# Patient Record
Sex: FEMALE | Race: WHITE | Employment: OTHER | URBAN - METROPOLITAN AREA
[De-identification: names, ages, dates, MRNs, and addresses within clinical notes are randomized per-mention and may not be internally consistent; named-entity substitution may affect disease eponyms.]

---

## 2017-09-11 ENCOUNTER — HOSPITAL ENCOUNTER (OUTPATIENT)
Dept: MAMMOGRAPHY | Age: 67
Discharge: HOME | End: 2017-09-11
Payer: MEDICARE

## 2017-09-11 DIAGNOSIS — Z12.31 VISIT FOR SCREENING MAMMOGRAM: ICD-10-CM

## 2017-09-11 PROCEDURE — 77067 SCR MAMMO BI INCL CAD: CPT

## 2018-06-20 ENCOUNTER — HOSPITAL ENCOUNTER (OUTPATIENT)
Dept: MRI IMAGING | Age: 68
Discharge: HOME | End: 2018-06-20
Attending: PHYSICAL MEDICINE & REHABILITATION
Payer: MEDICARE

## 2018-06-20 DIAGNOSIS — Z98.1 HISTORY OF LUMBAR FUSION: ICD-10-CM

## 2018-06-20 DIAGNOSIS — M54.16 BILATERAL LUMBAR RADICULOPATHY: ICD-10-CM

## 2018-06-20 PROCEDURE — 72148 MRI LUMBAR SPINE W/O DYE: CPT

## 2019-02-22 NOTE — PERIOP NOTES
[facility] Ambulatory Surgery Unit Pre-operative Instructions Procedure Date  2/26            Tentative Arrival Time 1300 1. On the day of your procedure, please report to the Ambulatory Surgery Unit Registration Desk and sign in at your designated time. The Ambulatory Surgery Unit is located in [facility] on the [facility] side of the [facility] across from the [address] [place] [address]. Please have all of your health insurance cards and a photo ID. 2. You must have someone with you to drive you home as directed by your surgeon. 3. You may have a light breakfast and take normal morning medications. 4. We recommend you do not drink any alcoholic beverages for 24 hours before and after your procedure. 5. Contact your surgeons office for instructions on the following medications: non-steroidal anti-inflammatory drugs (i.e. Advil, Aleve), vitamins, and supplements. (Some surgeons will want you to stop these medications prior to surgery and others may allow you to take them) **If you are currently taking Plavix, Coumadin, Aspirin and/or other blood-thinning agents, contact your surgeon for instructions. ** Your surgeon will partner with the physician prescribing these medications to determine if it is safe to stop or if you need to continue taking. Please do not stop taking these medications without instructions from your surgeon. 6. In an effort to help prevent surgical site infection, we ask that you shower with an anti-bacterial soap (i.e. Dial or Safeguard) on the morning of your procedure. Do not apply any lotions, powders, or deodorants after showering. 7. Wear comfortable clothes. Wear glasses instead of contacts. Do not bring any jewelry or money (other than copays or fees as instructed). Do not wear make-up, particularly mascara, the morning of your procedure. Wear your hair loose or down, no ponytails, buns, jake pins or clips.  All body piercings must be removed. 8. You should understand that if you do not follow these instructions your procedure may be cancelled. If your physical condition changes (i.e. fever, cold or flu) please contact your surgeon as soon as possible. 9. It is important that you be on time. If a situation occurs where you may be late, or if you have any questions or problems, please call [phone]. 
 
10. Your procedure time may be subject to change. You will receive a phone call the day prior to confirm your arrival time. I understand a pre-operative phone call will be made to verify my procedure time. In the event that I am not available, I give permission for a message to be left on my answering service and/or with another person? yes Reviewed by phone with pt, verbalized understanding 
 ___________________      ___________________      ___________________ 
(Signature of Patient)          (Witness)                   (Date and Time)

## 2019-02-26 ENCOUNTER — APPOINTMENT (OUTPATIENT)
Dept: GENERAL RADIOLOGY | Age: 69
End: 2019-02-26
Attending: PHYSICAL MEDICINE & REHABILITATION
Payer: MEDICARE

## 2019-02-26 ENCOUNTER — HOSPITAL ENCOUNTER (OUTPATIENT)
Age: 69
Setting detail: OUTPATIENT SURGERY
Discharge: HOME | End: 2019-02-26
Attending: PHYSICAL MEDICINE & REHABILITATION | Admitting: PHYSICAL MEDICINE & REHABILITATION
Payer: MEDICARE

## 2019-02-26 VITALS
HEIGHT: 66 IN | SYSTOLIC BLOOD PRESSURE: 137 MMHG | OXYGEN SATURATION: 97 % | DIASTOLIC BLOOD PRESSURE: 85 MMHG | TEMPERATURE: 98.8 F | HEART RATE: 80 BPM | RESPIRATION RATE: 16 BRPM | WEIGHT: 160 LBS | BODY MASS INDEX: 25.71 KG/M2

## 2019-02-26 PROCEDURE — 74011250636 HC RX REV CODE- 250/636: Performed by: PHYSICAL MEDICINE & REHABILITATION

## 2019-02-26 PROCEDURE — 76030000002 HC AMB SURG OR TIME FIRST 0.: Performed by: PHYSICAL MEDICINE & REHABILITATION

## 2019-02-26 PROCEDURE — 76000 FLUOROSCOPY <1 HR PHYS/QHP: CPT

## 2019-02-26 PROCEDURE — 77030003665 HC NDL SPN BBMI -A: Performed by: PHYSICAL MEDICINE & REHABILITATION

## 2019-02-26 PROCEDURE — 74011000250 HC RX REV CODE- 250: Performed by: PHYSICAL MEDICINE & REHABILITATION

## 2019-02-26 PROCEDURE — 76210000046 HC AMBSU PH II REC FIRST 0.5 HR: Performed by: PHYSICAL MEDICINE & REHABILITATION

## 2019-02-26 PROCEDURE — 74011636320 HC RX REV CODE- 636/320: Performed by: PHYSICAL MEDICINE & REHABILITATION

## 2019-02-26 RX ORDER — METHYLPREDNISOLONE ACETATE 40 MG/ML
40 INJECTION, SUSPENSION INTRA-ARTICULAR; INTRALESIONAL; INTRAMUSCULAR; SOFT TISSUE ONCE
Status: COMPLETED | OUTPATIENT
Start: 2019-02-26 | End: 2019-02-26

## 2019-02-26 RX ORDER — BUPIVACAINE HYDROCHLORIDE 5 MG/ML
5 INJECTION, SOLUTION EPIDURAL; INTRACAUDAL ONCE
Status: DISCONTINUED | OUTPATIENT
Start: 2019-02-26 | End: 2019-02-26 | Stop reason: HOSPADM

## 2019-02-26 RX ORDER — LIDOCAINE HYDROCHLORIDE 20 MG/ML
7 INJECTION, SOLUTION INFILTRATION; PERINEURAL ONCE
Status: COMPLETED | OUTPATIENT
Start: 2019-02-26 | End: 2019-02-26

## 2019-02-26 NOTE — PERIOP NOTES
Skin assessment: 
 WNL Skin color: normal for ethnicity Skin condition: intact Skin integrity: WNL Turgor: normal for age Neuro:  Push/Pull assessment: LUE Response: strong  LLE Response: strong push/moderate pull RUE Response: strong  RLE Response: strong push/moderate pull OB/Gyn assessment: 
 
LMP: n/a If no menstrual period then reason: n/a

## 2019-02-26 NOTE — H&P
Procedural Case Note 2/26/2019    (1:11 PM) Alysha Hsu 1950   (77 y.o.) 
 
[id number] CC:  pain ROS:   Complete ROS obtained, no CP, no SOB, no N or V 
 
PMH:    
Past Medical History:  
Diagnosis Date  Arthritis  Depression  GERD (gastroesophageal reflux disease)  High cholesterol  Hypertension  Nausea & vomiting  Psoriasis  Psychiatric disorder  Sleep apnea   
 as of 2/22/19 unable to tolerate CPAP ALLERGIES:    
Allergies Allergen Reactions  Demerol [Meperidine] Nausea and Vomiting  Tramadol Unknown (comments) Unknown reaction MEDS:    
No current facility-administered medications for this encounter. Visit Vitals Ht 5' 5.5\" (1.664 m) Wt 71.7 kg (158 lb) BMI 25.89 kg/m² PE: 
Gen: NAD Head: normocephalic Heart: RRR Lungs: CTA crystal Abd: NT, ND, soft Neuro: awake and alert Skin: intact IMPRESSION:   LS DDD Note:  The clinical status was discussed in detail with the patient. The procedure was again discussed and described in detail. All understand and accept the planned procedure and risks; reject other forms of treatment. All questions are answered.  
 
Cate Jenkins MD

## 2019-02-26 NOTE — DISCHARGE INSTRUCTIONS
Dr. Fransisco Salter Discharge Instructions  Transforaminal Epidural Steroid Injection/ Selective Nerve Block    You had a transforaminal epidural steroid injection/ selective nerve block today. You will probably have some numbness, and possibly weakness, in your leg for the next 6 to 8 hours. The steroids will slowly become effective, reducing your pain, over the next 2 weeks. You should begin feeling better after a few days, but it may take up to 2 weeks to notice the difference. The benefit you get from your injection will last a variable amount of time, depending on the severity of your lumbar spine problem.  Pain: Most people do not have any increase in pain after this injection. However, you might experience some soreness in your low back. If this happens, putting an ice pack over the sore area will help.  Bandage: You will have a small bandage covering the site of the injection. You may remove it once you get home.  Restrictions: Someone should drive you home after the injection. After that, you have no restrictions. You need to be careful while walking, as you may still have some numbness or weakness in your leg. You may resume your normal level of activity. You may take a shower or bath, and you may eat normally. You should continue your current exercises and/or therapy routine.   Medications: Continue your current medications as prescribed. If your pain decreases, you may reduce the amount of your pain medicines. If you stopped taking anticoagulants or blood-thinners before the injection, start them tomorrow. If you have diabetes, your blood sugar may be elevated for a few days. Call your primary doctor with any questions.   Call Dr. Fransisco Salter at [phone] if you experience:   Fever (101 degrees Fahrenheit or greater)   Nausea or vomiting   Headache unrelieved by your normal pain medicine   Redness or swelling at the injection site that lasts more than 1 day   New numbness, tingling, weakness, or pain that you didnt have before the injection    Follow-up appointment:   If still having pain in 1-2 weeks, call office at [phone] for a follow up appointment. DISCHARGE SUMMARY from Nurse    The following personal items collected during your admission are returned to you:   Dental Appliance: Dental Appliances: None  Vision:    Hearing Aid:    Jewelry:    Clothing:    Other Valuables:    Valuables sent to safe: If you were given prescriptions, please review the written information on prescribed medications. · You will receive a Post Operative Call from one of the Recovery Room Nurses on the day after your surgery to check on you. It is very important for us to know how you are recovering after your surgery. · You may receive an e-mail or letter in the mail from [facility] regarding your experience with us in the Ambulatory Surgery Unit. Your feedback is valuable to us and we appreciate your participation in the survey. If you have not had your influenza or pneumococcal vaccines, please follow up with your primary care physician. The discharge information has been reviewed with the patient. The patient verbalized understanding.

## 2019-02-26 NOTE — PERIOP NOTES
Evia Cheadle 1950 [id number] Situation: 
Verbal report given from: Desirae Latif RN Procedure: Procedure(s): BILATERAL L3-4 TRANSFORAMINAL  EPIDURAL STEROID INJECTION Background: 
 
Preoperative diagnosis: Degeneration of lumbar intervertebral disc [M51.36] Postoperative diagnosis: Degeneration of lumbar intervertebral disc [M51.36] :  Dr. Layne Boyle Assessment: 
Intra-procedure medications, procedure, and allergies reviewed Recommendation: 
 
Discharge patient home after discharge instructions reviewed with patient. Rest until local has worn off.

## 2019-02-26 NOTE — OP NOTES
Epidural Steroid Injection Operative Report    Indications: This is a 77 y.o. female who presents with low back pain. She was positive for LS DDD. The patient was admitted for surgery as conservative measures have failed. Date of Surgery: 2/26/2019    Preoperative Diagnosis: LS DDD    Postoperative Diagnosis: LS DDD    Surgeon(s) and Role:     * Meridith Hatchet, MD - Primary     Procedure:  Procedure(s):  BILATERAL L3-4 TRANSFORAMINAL  EPIDURAL STEROID INJECTION    Procedure in Detail:  After appropriate informed consent was obtained, the patient was taken to the operating suite and placed in the prone position on the operating table on appropriate padding. The LS region was prepped and draped in the usual sterile fashion. Intraoperative fluoroscopy was used to localize the LS spine. The skin was infiltrated with 2% lidocaine. An 22-g needle was advanced into the Brandan L3 neuroforamen under fluoroscopic guidance. A small amount of contrast was injected into the epidural space, confirming appropriate needle placement on fluoroscopy. Next, 2ml of 2% lidocaine and 80mg of Depo-Medrol were injected. The needle was removed from the patient. The patient was then turned back into the supine position on the stretcher and was taken to the Recovery Room in stable condition.     Estimated Blood Loss:  none     Specimens: None       Drains: None          Complications:  None    Signed By: Tiki Bee MD                        February 26, 2019

## 2019-02-26 NOTE — PERIOP NOTES
Pascale Gregory 1950 [id number] Situation: 
Verbal report given from: JOSE DANIEL Garcia RN Procedure: Procedure(s): BILATERAL L3-4 TRANSFORAMINAL  EPIDURAL STEROID INJECTION Background: 
 
Preoperative diagnosis: Degeneration of lumbar intervertebral disc [M51.36] Postoperative diagnosis: Degeneration of lumbar intervertebral disc [M51.36] :  Dr. Annie Oviedo Assessment: 
Intra-procedure medications, procedure, and allergies reviewed Recommendation: 
 
Discharge patient home after discharge instructions reviewed with patient. Rest until local has worn off.

## 2019-02-26 NOTE — PERIOP NOTES
Discharge instructions reviewed w/pt. She verbalized understanding. Vss. Denies pain. Injection site to lower back rishabh, intact. Moderate dorsi and plantar flexion to left leg, strong dorsi and plantar flexion to right leg. Pt. Discharged to car via wheelchair.

## 2019-08-02 ENCOUNTER — HOSPITAL ENCOUNTER (OUTPATIENT)
Dept: CT IMAGING | Age: 69
Discharge: HOME | End: 2019-08-02
Attending: INTERNAL MEDICINE
Payer: MEDICARE

## 2019-08-02 DIAGNOSIS — Z12.11 COLON CANCER SCREENING: ICD-10-CM

## 2019-08-02 DIAGNOSIS — R10.9 ABDOMINAL PAIN: ICD-10-CM

## 2019-08-02 LAB — CREAT BLD-MCNC: 0.6 MG/DL (ref 0.6–1.3)

## 2019-08-02 PROCEDURE — 82565 ASSAY OF CREATININE: CPT

## 2019-08-02 PROCEDURE — 74011636320 HC RX REV CODE- 636/320: Performed by: INTERNAL MEDICINE

## 2019-08-02 PROCEDURE — 74177 CT ABD & PELVIS W/CONTRAST: CPT

## 2019-08-02 PROCEDURE — 74011000255 HC RX REV CODE- 255: Performed by: INTERNAL MEDICINE

## 2019-08-02 RX ORDER — SODIUM CHLORIDE 0.9 % (FLUSH) 0.9 %
10 SYRINGE (ML) INJECTION
Status: COMPLETED | OUTPATIENT
Start: 2019-08-02 | End: 2019-08-02

## 2019-08-02 RX ORDER — BARIUM SULFATE 20 MG/ML
900 SUSPENSION ORAL
Status: COMPLETED | OUTPATIENT
Start: 2019-08-02 | End: 2019-08-02

## 2019-08-02 RX ADMIN — Medication 10 ML: at 12:45

## 2019-08-02 RX ADMIN — BARIUM SULFATE 900 ML: 21 SUSPENSION ORAL at 12:45

## 2019-08-02 RX ADMIN — IOPAMIDOL 100 ML: 755 INJECTION, SOLUTION INTRAVENOUS at 12:45

## 2019-10-17 ENCOUNTER — ANESTHESIA EVENT (OUTPATIENT)
Dept: ENDOSCOPY | Age: 69
End: 2019-10-17
Payer: MEDICARE

## 2019-10-17 ENCOUNTER — HOSPITAL ENCOUNTER (OUTPATIENT)
Age: 69
Setting detail: OUTPATIENT SURGERY
Discharge: HOME | End: 2019-10-17
Attending: INTERNAL MEDICINE | Admitting: INTERNAL MEDICINE
Payer: MEDICARE

## 2019-10-17 ENCOUNTER — ANESTHESIA (OUTPATIENT)
Dept: ENDOSCOPY | Age: 69
End: 2019-10-17
Payer: MEDICARE

## 2019-10-17 VITALS
DIASTOLIC BLOOD PRESSURE: 79 MMHG | SYSTOLIC BLOOD PRESSURE: 144 MMHG | BODY MASS INDEX: 26.33 KG/M2 | WEIGHT: 158 LBS | OXYGEN SATURATION: 97 % | HEIGHT: 65 IN | TEMPERATURE: 98.2 F | HEART RATE: 75 BPM | RESPIRATION RATE: 21 BRPM

## 2019-10-17 PROCEDURE — 74011250636 HC RX REV CODE- 250/636: Performed by: ANESTHESIOLOGY

## 2019-10-17 PROCEDURE — 77030013992 HC SNR POLYP ENDOSC BSC -B: Performed by: INTERNAL MEDICINE

## 2019-10-17 PROCEDURE — 77030021593 HC FCPS BIOP ENDOSC BSC -A: Performed by: INTERNAL MEDICINE

## 2019-10-17 PROCEDURE — 88305 TISSUE EXAM BY PATHOLOGIST: CPT

## 2019-10-17 PROCEDURE — 76060000031 HC ANESTHESIA FIRST 0.5 HR: Performed by: INTERNAL MEDICINE

## 2019-10-17 PROCEDURE — 76040000019: Performed by: INTERNAL MEDICINE

## 2019-10-17 PROCEDURE — 74011250636 HC RX REV CODE- 250/636: Performed by: INTERNAL MEDICINE

## 2019-10-17 PROCEDURE — 74011000250 HC RX REV CODE- 250: Performed by: ANESTHESIOLOGY

## 2019-10-17 RX ORDER — FLUMAZENIL 0.1 MG/ML
0.2 INJECTION INTRAVENOUS
Status: DISCONTINUED | OUTPATIENT
Start: 2019-10-17 | End: 2019-10-17 | Stop reason: HOSPADM

## 2019-10-17 RX ORDER — EPINEPHRINE 0.1 MG/ML
1 INJECTION INTRACARDIAC; INTRAVENOUS
Status: DISCONTINUED | OUTPATIENT
Start: 2019-10-17 | End: 2019-10-17 | Stop reason: HOSPADM

## 2019-10-17 RX ORDER — PROPOFOL 10 MG/ML
INJECTION, EMULSION INTRAVENOUS AS NEEDED
Status: DISCONTINUED | OUTPATIENT
Start: 2019-10-17 | End: 2019-10-17 | Stop reason: HOSPADM

## 2019-10-17 RX ORDER — LIDOCAINE HYDROCHLORIDE 20 MG/ML
INJECTION, SOLUTION EPIDURAL; INFILTRATION; INTRACAUDAL; PERINEURAL AS NEEDED
Status: DISCONTINUED | OUTPATIENT
Start: 2019-10-17 | End: 2019-10-17 | Stop reason: HOSPADM

## 2019-10-17 RX ORDER — NALOXONE HYDROCHLORIDE 0.4 MG/ML
0.4 INJECTION, SOLUTION INTRAMUSCULAR; INTRAVENOUS; SUBCUTANEOUS
Status: DISCONTINUED | OUTPATIENT
Start: 2019-10-17 | End: 2019-10-17 | Stop reason: HOSPADM

## 2019-10-17 RX ORDER — SODIUM CHLORIDE 0.9 % (FLUSH) 0.9 %
5-40 SYRINGE (ML) INJECTION EVERY 8 HOURS
Status: DISCONTINUED | OUTPATIENT
Start: 2019-10-17 | End: 2019-10-17 | Stop reason: HOSPADM

## 2019-10-17 RX ORDER — ATROPINE SULFATE 0.1 MG/ML
0.5 INJECTION INTRAVENOUS
Status: DISCONTINUED | OUTPATIENT
Start: 2019-10-17 | End: 2019-10-17 | Stop reason: HOSPADM

## 2019-10-17 RX ORDER — SODIUM CHLORIDE 0.9 % (FLUSH) 0.9 %
5-40 SYRINGE (ML) INJECTION AS NEEDED
Status: DISCONTINUED | OUTPATIENT
Start: 2019-10-17 | End: 2019-10-17 | Stop reason: HOSPADM

## 2019-10-17 RX ORDER — DEXTROMETHORPHAN/PSEUDOEPHED 2.5-7.5/.8
1.2 DROPS ORAL
Status: DISCONTINUED | OUTPATIENT
Start: 2019-10-17 | End: 2019-10-17 | Stop reason: HOSPADM

## 2019-10-17 RX ORDER — SODIUM CHLORIDE 9 MG/ML
75 INJECTION, SOLUTION INTRAVENOUS CONTINUOUS
Status: DISCONTINUED | OUTPATIENT
Start: 2019-10-17 | End: 2019-10-17 | Stop reason: HOSPADM

## 2019-10-17 RX ADMIN — PROPOFOL 200 MG: 10 INJECTION, EMULSION INTRAVENOUS at 11:24

## 2019-10-17 RX ADMIN — LIDOCAINE HYDROCHLORIDE 40 MG: 20 INJECTION, SOLUTION EPIDURAL; INFILTRATION; INTRACAUDAL; PERINEURAL at 11:06

## 2019-10-17 RX ADMIN — SODIUM CHLORIDE 75 ML/HR: 900 INJECTION, SOLUTION INTRAVENOUS at 10:54

## 2019-10-17 NOTE — ANESTHESIA POSTPROCEDURE EVALUATION
Procedure(s):  COLONOSCOPY  COLON BIOPSY  ENDOSCOPIC POLYPECTOMY. total IV anesthesia    Anesthesia Post Evaluation        Patient location during evaluation: PACU  Note status: Adequate. Level of consciousness: responsive to verbal stimuli and sleepy but conscious  Pain management: satisfactory to patient  Airway patency: patent  Anesthetic complications: no  Cardiovascular status: acceptable  Respiratory status: acceptable  Hydration status: acceptable  Comments: +Post-Anesthesia Evaluation and Assessment    Patient: Beto Lowry MRN: [id number]  SSN: [id number]   YOB: 1950  Age: 68 y.o. Sex: female      Cardiovascular Function/Vital Signs    /79   Pulse 75   Temp 36.8 °C (98.2 °F)   Resp 21   Ht 5' 5\" (1.651 m)   Wt 71.7 kg (158 lb)   SpO2 97%   Breastfeeding? No   BMI 26.29 kg/m²     Patient is status post Procedure(s) with comments:  COLONOSCOPY  COLON BIOPSY  ENDOSCOPIC POLYPECTOMY - cold snare. Nausea/Vomiting: Controlled. Postoperative hydration reviewed and adequate. Pain:  Pain Scale 1: Numeric (0 - 10) (10/17/19 1135)  Pain Intensity 1: 0 (10/17/19 1135)   Managed. Neurological Status: At baseline. Mental Status and Level of Consciousness: Arousable. Pulmonary Status:   O2 Device: Room air (10/17/19 1135)   Adequate oxygenation and airway patent. Complications related to anesthesia: None    Post-anesthesia assessment completed. No concerns. Signed By: Daylin Hamilton DO    10/17/2019  Post anesthesia nausea and vomiting:  controlled      Vitals Value Taken Time   /79 10/17/2019 11:48 AM   Temp     Pulse 76 10/17/2019 11:49 AM   Resp 17 10/17/2019 11:49 AM   SpO2 98 % 10/17/2019 11:49 AM   Vitals shown include unvalidated device data.

## 2019-10-17 NOTE — PROGRESS NOTES
Anesthesia reports 200mg Propofol, 40mg Lidocaine and 150mL NS given during procedure. Received report from anesthesia staff on vital signs and status of patient.

## 2019-10-17 NOTE — ANESTHESIA PREPROCEDURE EVALUATION
Relevant Problems   No relevant active problems       Anesthetic History     PONV (no prob with propofol for colonoscopy)          Review of Systems / Medical History  Patient summary reviewed, nursing notes reviewed and pertinent labs reviewed    Pulmonary        Sleep apnea (cannot tolerate CPAP):  No treatment           Neuro/Psych         Psychiatric history (depression)     Cardiovascular    Hypertension          Hyperlipidemia    Exercise tolerance: >4 METS  Comments: 2013 EKG:  NSR   GI/Hepatic/Renal     GERD           Endo/Other        Arthritis     Other Findings   Comments: Psoriasis         Physical Exam    Airway  Mallampati: I  TM Distance: 4 - 6 cm  Neck ROM: normal range of motion   Mouth opening: Normal     Cardiovascular  Regular rate and rhythm,  S1 and S2 normal,  no murmur, click, rub, or gallop             Dental  No notable dental hx       Pulmonary                 Abdominal  GI exam deferred  Ascites     Other Findings            Anesthetic Plan    ASA: 2  Anesthesia type: total IV anesthesia          Induction: Intravenous  Anesthetic plan and risks discussed with: Patient

## 2019-10-17 NOTE — DISCHARGE INSTRUCTIONS
Beto Lowry  [id number]  1950    COLON DISCHARGE INSTRUCTIONS  Discomfort:  Redness at IV site- apply warm compress to area; if redness or soreness persist- contact your physician  There may be a slight amount of blood passed from the rectum  Gaseous discomfort- walking, belching will help relieve any discomfort  You may not operate a vehicle for 12 hours  You may not engage in an occupation involving machinery or appliances for rest of today  You may not drink alcoholic beverages for at least 12 hours  Avoid making any critical decisions for at least 24 hour  DIET:   Regular diet. - however -  remember your colon is empty and a heavy meal will produce gas. Avoid these foods:  vegetables, fried / greasy foods, carbonated drinks for today  MEDICATION:  Per Medication Reconciliation       ACTIVITY:  You may not resume your normal daily activities until tomorrow AM; it is recommended that you spend the remainder of the day resting -  avoid any strenuous activity. CALL M.D. ANY SIGN OF:   Increasing pain, nausea, vomiting  Abdominal distension (swelling)  New increased bleeding (oral or rectal)  Fever (chills)  Pain in chest area  Bloody discharge from nose or mouth  Shortness of breath    You may not  take any Advil, Ibuprofen, Motrin, Aleve, or Goodys for 10 days, ONLY  Tylenol as needed for pain. IMPRESSION:  Impression:   1. 8 mm sessile polyp in sigmoid colon. Removed by cold snare polypectomy  2. Moderate left-sided diverticulosis  3. Medium sized internal hemorrhoids  4. Otherwise normal colonoscopy through to the cecum. Biopsies taken of whole colon to evaluate for microscopic colitis    Recommendations:  1. Follow up pathology  2.  Repeat Colonoscopy in 5 years for surveillance    Follow-up Instructions:   Call Dr. Luis Luke for the results of procedure / biopsy in 7-10 days  Telephone # [phone]    Tia Smith MD

## 2019-10-17 NOTE — H&P
Gastroenterology Outpatient History and Physical    Patient: Kisha Jaime    Physician: Nell Ba MD    Chief Complaint: CRC screening  History of Present Illness: No other complaints    History:  Past Medical History:   Diagnosis Date    Arthritis     Depression     GERD (gastroesophageal reflux disease)     High cholesterol     Hypertension     Nausea & vomiting     Psoriasis     Psychiatric disorder     Sleep apnea     as of 17 unable to tolerate CPAP      Past Surgical History:   Procedure Laterality Date    HX BLADDER SUSPENSION      HX  SECTION      HX DILATION AND CURETTAGE      HX HEENT Bilateral     retinal reattachment with bands r x2, lx1    HX TUBAL LIGATION        Social History     Socioeconomic History    Marital status:      Spouse name: Not on file    Number of children: Not on file    Years of education: Not on file    Highest education level: Not on file   Tobacco Use    Smoking status: Never Smoker    Smokeless tobacco: Never Used   Substance and Sexual Activity    Alcohol use: No    Drug use: No      Family History   Problem Relation Age of Onset    Other Mother         scleraderma    Heart Disease Father     Cancer Maternal Aunt         breast     Breast Cancer Maternal Aunt       Patient Active Problem List   Diagnosis Code    Lumbar post-laminectomy syndrome M96.1    Lumbosacral stenosis with neurogenic claudication ([facility] [address] M48.07, G95.19       Allergies: Allergies   Allergen Reactions    Demerol [Meperidine] Nausea and Vomiting    Tramadol Unknown (comments)     Unknown reaction     Medications:   Prior to Admission medications    Medication Sig Start Date End Date Taking? Authorizing Provider   CLOBETASOL PROPIONATE, BULK, 0.005 % by Does Not Apply route as needed. Ointment for psoriosis   Yes Provider, Historical   venlafaxine (EFFEXOR) 100 mg tablet Take 75 mg by mouth daily.  11  Yes Other, MD Harris losartan-hydrochlorothiazide (HYZAAR) 100-12.5 mg per tablet Take 1 Tab by mouth daily. Yes Other, MD Harris   simvastatin (ZOCOR) 10 mg tablet Take 10 mg by mouth nightly. Yes Other, MD Harris     Physical Exam:   Vital Signs: Blood pressure 143/87, pulse 85, temperature 97.8 °F (36.6 °C), resp. rate 18, height 5' 5\" (1.651 m), weight 71.7 kg (158 lb), SpO2 100 %, not currently breastfeeding.   General: well developed, well nourished   HEENT: unremarkable   Heart: regular rhythm no mumur    Lungs: clear   Abdominal:  benign   Neurological: unremarkable   Extremities: no edema     Findings/Diagnosis: CRC screening  Plan of Care/Planned Procedure: Colonoscopy with conscious/deep sedation    Signed:  Noam Sands MD 10/17/2019

## 2019-10-17 NOTE — PROGRESS NOTES
Malika López  1950  [id number]    Situation:  Verbal report received from: Anny Holder RN  Procedure: Procedure(s) with comments:  COLONOSCOPY  COLON BIOPSY  ENDOSCOPIC POLYPECTOMY - cold snare    Background:    Preoperative diagnosis: screening  Postoperative diagnosis: colon polyp, hemorrhoids, diverticulosis    :  Dr. Haroon Hazel  Assistant(s): Endoscopy Technician-1: Bhanu Villagomez  Endoscopy RN-1: Tessy Oneal RN    Specimens:   ID Type Source Tests Collected by Time Destination   1 : random colon bxs Preservative   Omar Hurt MD 10/17/2019 1114 Pathology   2 : sigmoid colon polyp Preservative Sigmoid  Omar Hurt MD 10/17/2019 1117 Pathology     H. Pylori  no    Assessment:  Anesthesia gave intra-procedure sedation and medications, see anesthesia flow sheet yes    Intravenous fluids: NS@ KVO     Vital signs stable      Abdominal assessment: round and soft      Recommendation:  Discharge patient per MD order .   Family or Friend    Permission to share finding with family or friend yes

## 2019-10-17 NOTE — PROCEDURES
NAME:  Brando Beal   :   1950   MRN:   [id number]     Date/Time:  10/17/2019 11:23 AM    Colonoscopy Operative Report    Procedure Type:   Colonoscopy with biopsy, polypectomy (cold snare)     Indications:     Screening colonoscopy  Pre-operative Diagnosis: see indication above  Post-operative Diagnosis:  See findings below  :  Tung Gordon MD  Referring Provider: --Michelet Manzanares MD    Exam:  Airway: clear, no airway problems anticipated  Heart: RRR, without gallops or rubs  Lungs: clear bilaterally without wheezes, crackles, or rhonchi  Abdomen: soft, nontender, nondistended, bowel sounds present  Mental Status: awake, alert and oriented to person, place and time    Sedation:  MAC anesthesia Propofol  Procedure Details:  After informed consent was obtained with all risks and benefits of procedure explained and preoperative exam completed, the patient was taken to the endoscopy suite and placed in the left lateral decubitus position. Upon sequential sedation as per above, a digital rectal exam was performed demonstrating internal hemorrhoids. The Olympus videocolonoscope  was inserted in the rectum and carefully advanced to the cecum, which was identified by the ileocecal valve and appendiceal orifice. The quality of preparation was good. The colonoscope was slowly withdrawn with careful evaluation between folds. Retroflexion in the rectum was completed demonstrating internal hemorrhoids. Findings:   1. 8 mm sessile polyp in sigmoid colon. Removed by cold snare polypectomy  2. Moderate left-sided diverticulosis  3. Medium sized internal hemorrhoids  4. Otherwise normal colonoscopy through to the cecum. Biopsies taken of whole colon to evaluate for microscopic colitis    Specimen Removed:  1. Whole colon 2. Sigmoid polyp  Complications: None. EBL:  None. Impression:   1. 8 mm sessile polyp in sigmoid colon. Removed by cold snare polypectomy  2.  Moderate left-sided diverticulosis  3. Medium sized internal hemorrhoids  4. Otherwise normal colonoscopy through to the cecum. Biopsies taken of whole colon to evaluate for microscopic colitis    Recommendations:  1. Follow up pathology  2. Repeat Colonoscopy in 5 years for surveillance    Discharge Disposition:  Home in the company of a  when able to ambulate.       Mac Way MD

## 2021-01-13 NOTE — PERIOP NOTES
[facility]  Ambulatory Surgery Unit  Pre-operative Instructions    Procedure Date  January 19, 2021            Tentative Arrival Time 1:30pm      1. On the day of your procedure, please report to the Ambulatory Surgery Unit Registration Desk and sign in at your designated time. The Ambulatory Surgery Unit is located in [facility] on the [facility] side of the [facility] across from the [address] [place] [address]. Please have all of your health insurance cards and a photo ID. 2. You must have someone with you to drive you home as directed by your surgeon. 3. You may have a light breakfast and take normal morning medications. 4. We recommend you do not drink any alcoholic beverages for 24 hours before and after your procedure. 5. Contact your surgeons office for instructions on the following medications: non-steroidal anti-inflammatory drugs (i.e. Advil, Aleve), vitamins, and supplements. (Some surgeons will want you to stop these medications prior to surgery and others may allow you to take them)   **If you are currently taking Plavix, Coumadin, Aspirin and/or other blood-thinning agents, contact your surgeon for instructions. ** Your surgeon will partner with the physician prescribing these medications to determine if it is safe to stop or if you need to continue taking. Please do not stop taking these medications without instructions from your surgeon. 6. In an effort to help prevent surgical site infection, we ask that you shower with an anti-bacterial soap (i.e. Dial or Safeguard) on the morning of your procedure. Do not apply any lotions, powders, or deodorants after showering. 7. Wear comfortable clothes. Wear glasses instead of contacts. Do not bring any jewelry or money (other than copays or fees as instructed). Do not wear make-up, particularly mascara, the morning of your procedure. Wear your hair loose or down, no ponytails, buns, jake pins or clips.  All body piercings must be removed. 8. You should understand that if you do not follow these instructions your procedure may be cancelled. If your physical condition changes (i.e. fever, cold or flu) please contact your surgeon as soon as possible. 9. It is important that you be on time. If a situation occurs where you may be late, or if you have any questions or problems, please call [phone].    10. Your procedure time may be subject to change. You will receive a phone call the day prior to confirm your arrival time. I understand a pre-operative phone call will be made to verify my procedure time. In the event that I am not available, I give permission for a message to be left on my answering service and/or with another person?       yes    Preop instructions reviewed  Pt verbalized understanding.          ___________________      ___________________      ___________________  (Signature of Patient)          (Witness)                   (Date and Time)

## 2021-01-18 RX ORDER — LIDOCAINE HYDROCHLORIDE 20 MG/ML
5 INJECTION, SOLUTION INFILTRATION; PERINEURAL ONCE
Status: COMPLETED | OUTPATIENT
Start: 2021-01-19 | End: 2021-01-19

## 2021-01-18 RX ORDER — BUPIVACAINE HYDROCHLORIDE 5 MG/ML
5 INJECTION, SOLUTION EPIDURAL; INTRACAUDAL ONCE
Status: COMPLETED | OUTPATIENT
Start: 2021-01-19 | End: 2021-01-19

## 2021-01-18 RX ORDER — METHYLPREDNISOLONE ACETATE 40 MG/ML
40 INJECTION, SUSPENSION INTRA-ARTICULAR; INTRALESIONAL; INTRAMUSCULAR; SOFT TISSUE ONCE
Status: ACTIVE | OUTPATIENT
Start: 2021-01-18 | End: 2021-01-19

## 2021-01-19 ENCOUNTER — APPOINTMENT (OUTPATIENT)
Dept: GENERAL RADIOLOGY | Age: 71
End: 2021-01-19
Attending: PHYSICAL MEDICINE & REHABILITATION
Payer: MEDICARE

## 2021-01-19 ENCOUNTER — HOSPITAL ENCOUNTER (OUTPATIENT)
Age: 71
Setting detail: OUTPATIENT SURGERY
Discharge: HOME | End: 2021-01-19
Attending: PHYSICAL MEDICINE & REHABILITATION | Admitting: PHYSICAL MEDICINE & REHABILITATION
Payer: MEDICARE

## 2021-01-19 VITALS
WEIGHT: 150 LBS | SYSTOLIC BLOOD PRESSURE: 158 MMHG | HEART RATE: 79 BPM | DIASTOLIC BLOOD PRESSURE: 91 MMHG | OXYGEN SATURATION: 99 % | RESPIRATION RATE: 18 BRPM | TEMPERATURE: 98.1 F | BODY MASS INDEX: 24.99 KG/M2 | HEIGHT: 65 IN

## 2021-01-19 PROCEDURE — 76030000002 HC AMB SURG OR TIME FIRST 0.: Performed by: PHYSICAL MEDICINE & REHABILITATION

## 2021-01-19 PROCEDURE — 72020 X-RAY EXAM OF SPINE 1 VIEW: CPT

## 2021-01-19 PROCEDURE — 74011000636 HC RX REV CODE- 636: Performed by: PHYSICAL MEDICINE & REHABILITATION

## 2021-01-19 PROCEDURE — 76000 FLUOROSCOPY <1 HR PHYS/QHP: CPT

## 2021-01-19 PROCEDURE — 2709999900 HC NON-CHARGEABLE SUPPLY: Performed by: PHYSICAL MEDICINE & REHABILITATION

## 2021-01-19 PROCEDURE — 77030003665 HC NDL SPN BBMI -A: Performed by: PHYSICAL MEDICINE & REHABILITATION

## 2021-01-19 PROCEDURE — 76210000046 HC AMBSU PH II REC FIRST 0.5 HR: Performed by: PHYSICAL MEDICINE & REHABILITATION

## 2021-01-19 PROCEDURE — 74011250636 HC RX REV CODE- 250/636: Performed by: PHYSICAL MEDICINE & REHABILITATION

## 2021-01-19 PROCEDURE — 74011000250 HC RX REV CODE- 250: Performed by: PHYSICAL MEDICINE & REHABILITATION

## 2021-01-19 RX ORDER — LORAZEPAM 1 MG/1
1 TABLET ORAL
COMMUNITY
End: 2021-10-11

## 2021-01-19 RX ORDER — METHYLPREDNISOLONE ACETATE 40 MG/ML
INJECTION, SUSPENSION INTRA-ARTICULAR; INTRALESIONAL; INTRAMUSCULAR; SOFT TISSUE AS NEEDED
Status: DISCONTINUED | OUTPATIENT
Start: 2021-01-19 | End: 2021-01-19 | Stop reason: HOSPADM

## 2021-01-19 NOTE — PERIOP NOTES
Pt with moderate dorsi and plantar flexion, pt states this is her norm. Injection site without drainage. Pt discharged via wheelchair, accompanied by RN. Pt discharged awake and alert, respirations equal and unlabored, skin warm, dry, and intact. Pt and family members' questions and concerns addressed prior to discharge.

## 2021-01-19 NOTE — H&P
Procedural Case Note    1/19/2021    (1:46 PM)    Nelly Cain    1950   (65 y.o.)    [id number]    CC:  pain    ROS:   Complete ROS obtained, no CP, no SOB, no N or V    PMH:     Past Medical History:   Diagnosis Date    Arthritis     Depression     GERD (gastroesophageal reflux disease)     High cholesterol     Hypertension     Nausea & vomiting     Psoriasis     Psychiatric disorder     Sleep apnea     as of 2/22/19 unable to tolerate CPAP       ALLERGIES:     Allergies   Allergen Reactions    Demerol [Meperidine] Nausea and Vomiting    Tramadol Unknown (comments)     Unknown reaction       MEDS:     Current Facility-Administered Medications   Medication Dose Route Frequency    bupivacaine (PF) (MARCAINE) 0.5 % (5 mg/mL) injection 25 mg  5 mL SubCUTAneous ONCE    lidocaine (XYLOCAINE) 20 mg/mL (2 %) injection 100 mg  5 mL IntraDERMal ONCE    iohexoL (OMNIPAQUE) 180 mg iodine/mL injection 5 mL  5 mL Intra artICUlar ONCE          Visit Vitals  /73 (BP 1 Location: Right arm, BP Patient Position: At rest)   Pulse 78   Temp 98.5 °F (36.9 °C)   Resp 16   Ht 5' 5\" (1.651 m)   Wt 68 kg (150 lb)   SpO2 95%   BMI 24.96 kg/m²     PE:  Gen: NAD  Head: normocephalic  Heart: RRR  Lungs: CTA crystal  Abd: NT, ND, soft  Neuro: awake and alert  Skin: intact    IMPRESSION:   LS DDD/DJD    Note:  The clinical status was discussed in detail with the patient. The procedure was again discussed and described in detail. All understand and accept the planned procedure and risks; reject other forms of treatment. All questions are answered.     Angélica Bustillos MD

## 2021-01-19 NOTE — PERIOP NOTES
Yu Jernigan  1950  [id number]    Situation:  Verbal report given from: JIM Pak RN  Procedure: Procedure(s):  BILATERAL L3 TRANSFORAMINAL EPIDURAL STEROID INJECTION, BILATERAL L3-4 FACET INJECTION    Background:    Preoperative diagnosis: DDD (degenerative disc disease), lumbar [M51.36]    Postoperative diagnosis: DDD (degenerative disc disease), lumbar [M51.36]    :  Dr. Gonzales Heaps:  Intra-procedure medications, procedure, and allergies reviewed        Recommendation:    Discharge patient home after discharge instructions reviewed with patient. Rest until local has worn off.

## 2021-01-19 NOTE — PERIOP NOTES
Patients friend, Cecilia Cabrera, will be driving patient home. Hand  are equally strong, bilat. Leg/foot pushes/pulls are equally strong, bilat.

## 2021-01-19 NOTE — OP NOTES
Epidural and Facet Steroid Injection Operative Report    Indications: This is a 65 y.o. female who presents with low back pain. She was positive for LS DDD and LS DJD. The patient was admitted for surgery as conservative measures have failed. Date of Surgery: 1/19/2021    Preoperative Diagnosis: LS DDD/DJD    Postoperative Diagnosis: LS DDD/DJD    Surgeon(s) and Role:     * Trista Pena MD - Primary     Procedure:  Procedure(s):  BILATERAL L3 TRANSFORAMINAL EPIDURAL STEROID INJECTION, BILATERAL L3-4 FACET INJECTION    Procedure in Detail:  After appropriate informed consent was obtained, the patient was taken to the operating suite and placed in the prone position on the operating table on appropriate padding. The LS region was prepped and draped in the usual sterile fashion. Intraoperative fluoroscopy was used to localize the LS spine. The skin was infiltrated with 2% lidocaine. An 22-g needle was advanced into the Brandan L3 neuroforamen and the Brandan L3-4 facets under fluoroscopic guidance. A small amount of Isovue was injected into the epidural space, confirming appropriate needle placement on fluoroscopy. Next, 2ml of 2% lidocaine and 40mg of Depo-Medrol were injected into the epidural space, and  1ml of 0.5% marcaine and 40mg of Depo-Medrol were injected into the facet joints. The needle was removed from the patient. The patient was then turned back into the supine position on the stretcher and was taken to the Recovery Room in stable condition.     Estimated Blood Loss:  none     Specimens: None       Drains: None          Complications:  None    Signed By: Giovany Simmons MD                        January 19, 2021

## 2021-02-11 ENCOUNTER — TRANSCRIBE ORDER (OUTPATIENT)
Dept: SCHEDULING | Age: 71
End: 2021-02-11

## 2021-02-11 DIAGNOSIS — M51.36 DDD (DEGENERATIVE DISC DISEASE), LUMBAR: Primary | ICD-10-CM

## 2021-02-17 ENCOUNTER — HOSPITAL ENCOUNTER (OUTPATIENT)
Dept: MRI IMAGING | Age: 71
Discharge: HOME | End: 2021-02-17
Attending: PHYSICAL MEDICINE & REHABILITATION
Payer: MEDICARE

## 2021-02-17 DIAGNOSIS — M51.36 DDD (DEGENERATIVE DISC DISEASE), LUMBAR: ICD-10-CM

## 2021-02-17 PROCEDURE — 72148 MRI LUMBAR SPINE W/O DYE: CPT

## 2021-02-23 NOTE — PERIOP NOTES
[facility]  Ambulatory Surgery Unit  Pre-operative Instructions    Procedure Date  03/02            Tentative Arrival Time 1400      1. On the day of your procedure, please report to the Ambulatory Surgery Unit Registration Desk and sign in at your designated time. The Ambulatory Surgery Unit is located in [facility] on the [facility] side of the [facility] across from the [address] [place] [address]. Please have all of your health insurance cards and a photo ID. 2. You must have someone with you to drive you home as directed by your surgeon. 3. You may have a light breakfast and take normal morning medications. 4. We recommend you do not drink any alcoholic beverages for 24 hours before and after your procedure. 5. Contact your surgeons office for instructions on the following medications: non-steroidal anti-inflammatory drugs (i.e. Advil, Aleve), vitamins, and supplements. (Some surgeons will want you to stop these medications prior to surgery and others may allow you to take them)   **If you are currently taking Plavix, Coumadin, Aspirin and/or other blood-thinning agents, contact your surgeon for instructions. ** Your surgeon will partner with the physician prescribing these medications to determine if it is safe to stop or if you need to continue taking. Please do not stop taking these medications without instructions from your surgeon. 6. In an effort to help prevent surgical site infection, we ask that you shower with an anti-bacterial soap (i.e. Dial or Safeguard) on the morning of your procedure. Do not apply any lotions, powders, or deodorants after showering. 7. Wear comfortable clothes. Wear glasses instead of contacts. Do not bring any jewelry or money (other than copays or fees as instructed). Do not wear make-up, particularly mascara, the morning of your procedure. Wear your hair loose or down, no ponytails, buns, jake pins or clips.  All body piercings must be removed. 8. You should understand that if you do not follow these instructions your procedure may be cancelled. If your physical condition changes (i.e. fever, cold or flu) please contact your surgeon as soon as possible. 9. It is important that you be on time. If a situation occurs where you may be late, or if you have any questions or problems, please call [phone].    10. Your procedure time may be subject to change. You will receive a phone call the day prior to confirm your arrival time. I understand a pre-operative phone call will be made to verify my procedure time. In the event that I am not available, I give permission for a message to be left on my answering service and/or with another person?       yes         ___________________      ___________________      ___________________  (Signature of Patient)          (Witness)                   (Date and Time)

## 2021-03-02 ENCOUNTER — HOSPITAL ENCOUNTER (OUTPATIENT)
Age: 71
Setting detail: OUTPATIENT SURGERY
Discharge: HOME | End: 2021-03-02
Attending: PHYSICAL MEDICINE & REHABILITATION | Admitting: PHYSICAL MEDICINE & REHABILITATION
Payer: MEDICARE

## 2021-03-02 ENCOUNTER — HOSPITAL ENCOUNTER (OUTPATIENT)
Dept: GENERAL RADIOLOGY | Age: 71
Setting detail: OUTPATIENT SURGERY
Discharge: HOME | End: 2021-03-02
Attending: PHYSICAL MEDICINE & REHABILITATION | Admitting: PHYSICAL MEDICINE & REHABILITATION
Payer: MEDICARE

## 2021-03-02 ENCOUNTER — APPOINTMENT (OUTPATIENT)
Dept: GENERAL RADIOLOGY | Age: 71
End: 2021-03-02
Attending: PHYSICAL MEDICINE & REHABILITATION
Payer: MEDICARE

## 2021-03-02 VITALS
OXYGEN SATURATION: 94 % | DIASTOLIC BLOOD PRESSURE: 85 MMHG | TEMPERATURE: 98.1 F | RESPIRATION RATE: 17 BRPM | WEIGHT: 155 LBS | HEIGHT: 65 IN | SYSTOLIC BLOOD PRESSURE: 137 MMHG | HEART RATE: 82 BPM | BODY MASS INDEX: 25.83 KG/M2

## 2021-03-02 PROCEDURE — 2709999900 HC NON-CHARGEABLE SUPPLY: Performed by: PHYSICAL MEDICINE & REHABILITATION

## 2021-03-02 PROCEDURE — 72020 X-RAY EXAM OF SPINE 1 VIEW: CPT

## 2021-03-02 PROCEDURE — 76000 FLUOROSCOPY <1 HR PHYS/QHP: CPT

## 2021-03-02 PROCEDURE — 74011250636 HC RX REV CODE- 250/636: Performed by: PHYSICAL MEDICINE & REHABILITATION

## 2021-03-02 PROCEDURE — 74011000250 HC RX REV CODE- 250: Performed by: PHYSICAL MEDICINE & REHABILITATION

## 2021-03-02 PROCEDURE — 76030000002 HC AMB SURG OR TIME FIRST 0.: Performed by: PHYSICAL MEDICINE & REHABILITATION

## 2021-03-02 PROCEDURE — 76210000046 HC AMBSU PH II REC FIRST 0.5 HR: Performed by: PHYSICAL MEDICINE & REHABILITATION

## 2021-03-02 PROCEDURE — 77030003665 HC NDL SPN BBMI -A: Performed by: PHYSICAL MEDICINE & REHABILITATION

## 2021-03-02 RX ORDER — LIDOCAINE HYDROCHLORIDE 20 MG/ML
10 INJECTION, SOLUTION INFILTRATION; PERINEURAL ONCE
Status: COMPLETED | OUTPATIENT
Start: 2021-03-02 | End: 2021-03-02

## 2021-03-02 RX ORDER — BUPIVACAINE HYDROCHLORIDE 5 MG/ML
10 INJECTION, SOLUTION EPIDURAL; INTRACAUDAL ONCE
Status: COMPLETED | OUTPATIENT
Start: 2021-03-02 | End: 2021-03-02

## 2021-03-02 RX ORDER — METHYLPREDNISOLONE ACETATE 40 MG/ML
40 INJECTION, SUSPENSION INTRA-ARTICULAR; INTRALESIONAL; INTRAMUSCULAR; SOFT TISSUE ONCE
Status: COMPLETED | OUTPATIENT
Start: 2021-03-02 | End: 2021-03-02

## 2021-03-02 NOTE — PERIOP NOTES
Patient: Hunter Cates MRN: [id number]  SSN: [id number]   YOB: 1950  Age: 65 y.o. Sex: female     Patient is status post Procedure(s):  RIGHT SACROILIAC JOINT INJECTION.     Surgeon(s) and Role:     * Vanessa Graves MD - Primary    Local/Dose/Irrigation:  SEE MAR                                         Dressing/Packing:      Leidy Flores

## 2021-03-02 NOTE — H&P
Procedural Case Note    3/2/2021    (2:28 PM)    Brie Guerrero    1950   (65 y.o.)    [id number]    CC:  pain    ROS:   Complete ROS obtained, no CP, no SOB, no N or V    PMH:     Past Medical History:   Diagnosis Date    Arthritis     Depression     GERD (gastroesophageal reflux disease)     High cholesterol     Hypertension     Nausea & vomiting     Psoriasis     Psychiatric disorder     Sleep apnea     as of 2/22/19 unable to tolerate CPAP       ALLERGIES:     Allergies   Allergen Reactions    Demerol [Meperidine] Nausea and Vomiting    Tramadol Unknown (comments)     Unknown reaction       MEDS:     Current Facility-Administered Medications   Medication Dose Route Frequency    methylPREDNISolone acetate (DEPO-MEDROL) 40 mg/mL injection 40 mg  40 mg IntraMUSCular ONCE    bupivacaine (PF) (MARCAINE) 0.5 % (5 mg/mL) injection 50 mg  10 mL Epidural ONCE    lidocaine (XYLOCAINE) 20 mg/mL (2 %) injection 200 mg  10 mL IntraDERMal ONCE    iohexoL (OMNIPAQUE) 180 mg iodine/mL injection 10 mL  10 mL Intra artICUlar ONCE          Visit Vitals  BP (!) 145/88 (BP 1 Location: Right upper arm, BP Patient Position: At rest)   Pulse 84   Temp 98.1 °F (36.7 °C)   Resp 17   Ht 5' 5\" (1.651 m)   Wt 70.3 kg (155 lb)   SpO2 96%   BMI 25.79 kg/m²     PE:  Gen: NAD  Head: normocephalic  Heart: RRR  Lungs: CTA crystal  Abd: NT, ND, soft  Neuro: awake and alert  Skin: intact    IMPRESSION:   Sacroiliitis    Note:  The clinical status was discussed in detail with the patient. The procedure was again discussed and described in detail. All understand and accept the planned procedure and risks; reject other forms of treatment. All questions are answered.     Ania Sterling MD

## 2021-03-02 NOTE — PERIOP NOTES
Morgan Reena  1950  [id number]    Situation:  Verbal report given from: GAMA Nelson RN  Procedure: Procedure(s):  RIGHT SACROILIAC JOINT INJECTION    Background:    Preoperative diagnosis: Sacroiliitis, not elsewhere classified ([facility] [address] [M46.1]    Postoperative diagnosis: Sacroiliitis, not elsewhere classified ([facility] [address] [M46.1]    :  Dr. Madison May    Assistant(s): Circ-1: Priyank Pillai RN  Local Nurse Monitor: Jenise Cole RN  Scrub Tech-1: Phoenix Lawrence    Specimens: * No specimens in log *    Assessment:  Intra-procedure medications         Anesthesia gave intra-procedure sedation and medications, see anesthesia flow sheet     Intravenous fluids: LR@ KVO     Vital signs stable

## 2021-03-02 NOTE — DISCHARGE INSTRUCTIONS
Dr. Stefanie Glez Discharge Instructions    You had a sacroiliac joint injection today. The steroids will slowly become effective, reducing your pain, over the next 2 weeks. You should begin feeling better after a few days, but it may take up to 2 weeks to notice the difference. The benefit you get from your injection will last a variable amount of time, depending on the severity of your spine problem. You may need a series of injections, up to three (3) per year, depending on your response to this one.  PAIN: Most people do not have any increase in pain after this injection. Hoever, you might experience some soreness in your low back. If this happens, putting an ice pack over the sore area will help,   Bandage: You will have a small bandage covering the site of the injection. You may remove it once you get home.  Restrictions: Someone should drive you home after the injection. After that, you have no restrictions. You may resume your normal level of activity. You may take a shower or bath, and you may eat normally. You should continue your current exercises and /or therapy routine.  Medications: Continue your current medications as prescribed. If your pain decreases, you may reduce the amount of your pain medicines. If you stopped taking anticoagulants or blood-thinners before the injection, start them tomorrow. If you have diabetes, your blood sugar may be elevated for a few days. Call your primary doctor with any questions.   Call Dr. Quinton Matos at [phone] if you experience:   Fever (101 degrees Fahrenheit or greater)   Nausea or vomiting   Headache unrelieved by your normal pain medicine   Redness or swelling at the injection site that lasts more than 1 day   New numbness, tingling, weakness, or pain that you didnt have before the injection  Follow- up appointment: 2-4 weeks      If still having pain in 1-2 weeks, call office at [phone] for a follow up appointment. DISCHARGE SUMMARY from Nurse    The following personal items collected during your admission are returned to you:   Dental Appliance: Dental Appliances: None  Vision:    Hearing Aid:    Jewelry: Jewelry: Earrings, With patient  Clothing: Clothing: With patient  Other Valuables: Other Valuables: Cane, With patient  Valuables sent to safe: If you were given prescriptions, please review the written information on prescribed medications. · You will receive a Post Operative Call from one of the Recovery Room Nurses on the day after your surgery to check on you. It is very important for us to know how you are recovering after your surgery. · You may receive an e-mail or letter in the mail from Edd Hurley regarding your experience with us in the Ambulatory Surgery Unit. Your feedback is valuable to us and we appreciate your participation in the survey. If you have not had your influenza or pneumococcal vaccines, please follow up with your primary care physician. The discharge information has been reviewed with the patient. The patient verbalized understanding.

## 2021-03-02 NOTE — OP NOTES
SI Steroid Injection Operative Report    Indications: This is a 65 y.o. female who presents with LBP. She was positive for Sacroiliitis. The patient was admitted for surgery as conservative measures have failed. Date of Surgery: 3/2/2021    Preoperative Diagnosis: Sacroiliitis    Postoperative Diagnosis: Sacroiliitis    Surgeon(s) and Role:     * Trista Pena MD - Primary     Procedure:  Procedure(s):  RIGHT SACROILIAC JOINT INJECTION    Procedure in Detail:  After appropriate informed consent was obtained, the patient was taken to the operating suite and placed in the prone position on the operating table on appropriate padding. The LS region was prepped and draped in the usual sterile fashion. Intraoperative fluoroscopy was used to localize the LS spine. The skin was infiltrated with 2% lidocaine. An 22-g needle was advanced into the Right SI joints under fluoroscopic guidance. Next, 2ml of 0.5% marcaine and 40mg of Depo-Medrol were injected. The needle was removed from the patient. The patient was then turned back into the supine position on the stretcher and was taken to the Recovery Room in stable condition.     Estimated Blood Loss:  none     Specimens: None       Drains: None          Complications:  None    Signed By: Giovany Simmons MD                        March 2, 2021

## 2021-03-02 NOTE — PERIOP NOTES
Patient received to PACU, VSS. Patient awake and alert, injection site to back intact. Patient has no complaints of pain. Neuro:  Push/Pull assessment:     LLE Response: strong push/pull   RLE Response: strong push/pull    Discharge instructions given. Patient verbalizes understanding of instructions and follow up appointment. Patient states ready for discharge. Patient discharged at this time by wheelchair with belongings, friend Madeline Pope to provide transportation home.

## 2021-05-19 RX ORDER — VENLAFAXINE HYDROCHLORIDE 37.5 MG/1
75 CAPSULE, EXTENDED RELEASE ORAL DAILY
COMMUNITY

## 2021-05-19 NOTE — PERIOP NOTES
[facility]  Ambulatory Surgery Unit  Pre-operative Instructions    Procedure Date  5/25            Tentative Arrival Time 1:00pm      1. On the day of your procedure, please report to the Ambulatory Surgery Unit Registration Desk and sign in at your designated time. The Ambulatory Surgery Unit is located in [facility] on the [facility] side of the \[facility]\"" across from the [address] [place] [address]. Please have all of your health insurance cards and a photo ID. 2. You must have someone with you to drive you home as directed by your surgeon. 3. You may have a light breakfast and take normal morning medications. 4. We recommend you do not drink any alcoholic beverages for 24 hours before and after your procedure. 5. Contact your surgeons office for instructions on the following medications: non-steroidal anti-inflammatory drugs (i.e. Advil, Aleve), vitamins, and supplements. (Some surgeons will want you to stop these medications prior to surgery and others may allow you to take them)   **If you are currently taking Plavix, Coumadin, Aspirin and/or other blood-thinning agents, contact your surgeon for instructions. ** Your surgeon will partner with the physician prescribing these medications to determine if it is safe to stop or if you need to continue taking. Please do not stop taking these medications without instructions from your surgeon. 6. In an effort to help prevent surgical site infection, we ask that you shower with an anti-bacterial soap (i.e. Dial or Safeguard) on the morning of your procedure. Do not apply any lotions, powders, or deodorants after showering. 7. Wear comfortable clothes. Wear glasses instead of contacts. Do not bring any jewelry or money (other than copays or fees as instructed). Do not wear make-up, particularly mascara, the morning of your procedure. Wear your hair loose or down, no ponytails, buns, jake pins or clips.  All body piercings must be removed. 8. You should understand that if you do not follow these instructions your procedure may be cancelled. If your physical condition changes (i.e. fever, cold or flu) please contact your surgeon as soon as possible. 9. It is important that you be on time. If a situation occurs where you may be late, or if you have any questions or problems, please call [phone].    10. Your procedure time may be subject to change. You will receive a phone call the day prior to confirm your arrival time. I understand a pre-operative phone call will be made to verify my procedure time. In the event that I am not available, I give permission for a message to be left on my answering service and/or with another person?       yes      Reviewed by phone with pt, verbalized understanding.   ___________________      ___________________      ___________________  (Signature of Patient)          (Witness)                   (Date and Time)

## 2021-05-25 ENCOUNTER — APPOINTMENT (OUTPATIENT)
Dept: GENERAL RADIOLOGY | Age: 71
End: 2021-05-25
Attending: PHYSICAL MEDICINE & REHABILITATION
Payer: MEDICARE

## 2021-05-25 ENCOUNTER — HOSPITAL ENCOUNTER (OUTPATIENT)
Age: 71
Setting detail: OUTPATIENT SURGERY
Discharge: HOME | End: 2021-05-25
Attending: PHYSICAL MEDICINE & REHABILITATION | Admitting: PHYSICAL MEDICINE & REHABILITATION
Payer: MEDICARE

## 2021-05-25 VITALS
OXYGEN SATURATION: 95 % | HEART RATE: 79 BPM | HEIGHT: 65 IN | TEMPERATURE: 98 F | SYSTOLIC BLOOD PRESSURE: 150 MMHG | BODY MASS INDEX: 25.49 KG/M2 | WEIGHT: 153 LBS | RESPIRATION RATE: 18 BRPM | DIASTOLIC BLOOD PRESSURE: 99 MMHG

## 2021-05-25 PROCEDURE — 77030003665 HC NDL SPN BBMI -A: Performed by: PHYSICAL MEDICINE & REHABILITATION

## 2021-05-25 PROCEDURE — 74011000250 HC RX REV CODE- 250: Performed by: PHYSICAL MEDICINE & REHABILITATION

## 2021-05-25 PROCEDURE — 76000 FLUOROSCOPY <1 HR PHYS/QHP: CPT

## 2021-05-25 PROCEDURE — 74011250636 HC RX REV CODE- 250/636: Performed by: PHYSICAL MEDICINE & REHABILITATION

## 2021-05-25 PROCEDURE — 76210000046 HC AMBSU PH II REC FIRST 0.5 HR: Performed by: PHYSICAL MEDICINE & REHABILITATION

## 2021-05-25 PROCEDURE — 77030003666 HC NDL SPINAL BD -A: Performed by: PHYSICAL MEDICINE & REHABILITATION

## 2021-05-25 PROCEDURE — 76030000002 HC AMB SURG OR TIME FIRST 0.: Performed by: PHYSICAL MEDICINE & REHABILITATION

## 2021-05-25 PROCEDURE — 2709999900 HC NON-CHARGEABLE SUPPLY: Performed by: PHYSICAL MEDICINE & REHABILITATION

## 2021-05-25 PROCEDURE — 74011000636 HC RX REV CODE- 636: Performed by: PHYSICAL MEDICINE & REHABILITATION

## 2021-05-25 RX ORDER — BUPIVACAINE HYDROCHLORIDE 5 MG/ML
10 INJECTION, SOLUTION EPIDURAL; INTRACAUDAL ONCE
Status: DISCONTINUED | OUTPATIENT
Start: 2021-05-25 | End: 2021-05-25 | Stop reason: HOSPADM

## 2021-05-25 RX ORDER — LIDOCAINE HYDROCHLORIDE 20 MG/ML
10 INJECTION, SOLUTION INFILTRATION; PERINEURAL ONCE
Status: COMPLETED | OUTPATIENT
Start: 2021-05-25 | End: 2021-05-25

## 2021-05-25 RX ORDER — METHYLPREDNISOLONE ACETATE 40 MG/ML
80 INJECTION, SUSPENSION INTRA-ARTICULAR; INTRALESIONAL; INTRAMUSCULAR; SOFT TISSUE ONCE
Status: COMPLETED | OUTPATIENT
Start: 2021-05-25 | End: 2021-05-25

## 2021-05-25 NOTE — OP NOTES
Epidural Steroid Injection Operative Report    Indications: This is a 65 y.o. female who presents with low back pain. She was positive for LS DDD. The patient was admitted for surgery as conservative measures have failed. Date of Surgery: 5/25/2021    Preoperative Diagnosis: LS DDD    Postoperative Diagnosis: LS DDD    Surgeon(s) and Role:     * Kevin Pierre MD - Primary     Procedure:  Procedure(s):  RIGHT L2 & L3 TFESI    Procedure in Detail:  After appropriate informed consent was obtained, the patient was taken to the operating suite and placed in the prone position on the operating table on appropriate padding. The LS region was prepped and draped in the usual sterile fashion. Intraoperative fluoroscopy was used to localize the LS spine. The skin was infiltrated with 2% lidocaine. An 22-g needle was advanced into the Right L2 and L3 neuroforamen under fluoroscopic guidance. A small amount of contrast was injected into the epidural space, confirming appropriate needle placement on fluoroscopy. Next, 2ml of 2% lidocaine and 80mg of Depo-Medrol were injected. The needle was removed from the patient. The patient was then turned back into the supine position on the stretcher and was taken to the Recovery Room in stable condition.     Estimated Blood Loss:  none     Specimens: None       Drains: None          Complications:  None    Signed By: Amrit Bradford MD                        May 25, 2021

## 2021-05-25 NOTE — DISCHARGE INSTRUCTIONS
Dr. Lorenzo Pa Discharge Instructions  Transforaminal Epidural Steroid Injection/ Selective Nerve Block    You had a transforaminal epidural steroid injection/ selective nerve block today. You will probably have some numbness, and possibly weakness, in your leg for the next 6 to 8 hours. The steroids will slowly become effective, reducing your pain, over the next 2 weeks. You should begin feeling better after a few days, but it may take up to 2 weeks to notice the difference. The benefit you get from your injection will last a variable amount of time, depending on the severity of your lumbar spine problem.  Pain: Most people do not have any increase in pain after this injection. However, you might experience some soreness in your low back. If this happens, putting an ice pack over the sore area will help.  Bandage: You will have a small bandage covering the site of the injection. You may remove it once you get home.  Restrictions: Someone should drive you home after the injection. After that, you have no restrictions. You need to be careful while walking, as you may still have some numbness or weakness in your leg. You may resume your normal level of activity. You may take a shower or bath, and you may eat normally. You should continue your current exercises and/or therapy routine.   Medications: Continue your current medications as prescribed. If your pain decreases, you may reduce the amount of your pain medicines. If you stopped taking anticoagulants or blood-thinners before the injection, start them tomorrow. If you have diabetes, your blood sugar may be elevated for a few days. Call your primary doctor with any questions.   Call Dr. Lorenzo Pa at [phone] if you experience:   Fever (101 degrees Fahrenheit or greater)   Nausea or vomiting   Headache unrelieved by your normal pain medicine   Redness or swelling at the injection site that lasts more than 1 day   New numbness, tingling, weakness, or pain that you didnt have before the injection    Follow-up appointment:   If still having pain in 1-2 weeks, call office at [phone] for a follow up appointment. DISCHARGE SUMMARY from Nurse    The following personal items collected during your admission are returned to you:   Dental Appliance: Dental Appliances: None  Vision: Visual Aid: Glasses  Hearing Aid:    Jewelry: Jewelry: None  Clothing: Clothing: With patient  Other Valuables: Other Valuables: Cane (under stretcher)  Valuables sent to safe: If you were given prescriptions, please review the written information on prescribed medications. · You will receive a Post Operative Call from one of the Recovery Room Nurses on the day after your surgery to check on you. It is very important for us to know how you are recovering after your surgery. · You may receive an e-mail or letter in the mail from [place] regarding your experience with us in the Ambulatory Surgery Unit. Your feedback is valuable to us and we appreciate your participation in the survey. If you have not had your influenza or pneumococcal vaccines, please follow up with your primary care physician. The discharge information has been reviewed with the patient. The patient verbalized understanding.

## 2021-05-25 NOTE — PERIOP NOTES
Patient received to PACU, VSS. Patient awake and alert with no complaints of pain. Injection site intact. Neuro:  Push/Pull assessment:     LLE Response: moderate push/pull   RLE Response: strong push/pull     Discharge instructions given. Patient verbalized understanding of instructions and follow up. Patient states ready for discharge - patient discharged at this time by wheelchair with all belongings.  to provide transportation home.

## 2021-05-25 NOTE — PERIOP NOTES
Pt has strong and equal plantar and dorsi flexion on left side, mildly weaker on right    Pt able to stand and hold weight with cane

## 2021-05-25 NOTE — H&P
Procedural Case Note    5/25/2021    (1:49 PM)    Don Mayorga    1950   (65 y.o.)    [id number]    CC:  pain    ROS:   Complete ROS obtained, no CP, no SOB, no N or V    PMH:     Past Medical History:   Diagnosis Date    Arthritis     COVID-19 vaccine series completed 04/21/2021    Moderna    Depression     Foot drop, left     GERD (gastroesophageal reflux disease)     High cholesterol     Hypertension     Nausea & vomiting     Psoriasis     Sleep apnea     as of 5/19/21 unable to tolerate CPAP       ALLERGIES:     Allergies   Allergen Reactions    Demerol [Meperidine] Nausea and Vomiting    Tramadol Unknown (comments)     Unknown reaction       MEDS:     Current Facility-Administered Medications   Medication Dose Route Frequency    methylPREDNISolone acetate (DEPO-MEDROL) 40 mg/mL injection 80 mg  80 mg Intra artICUlar ONCE    bupivacaine (PF) (MARCAINE) 0.5 % (5 mg/mL) injection 50 mg  10 mL Epidural ONCE    lidocaine (XYLOCAINE) 20 mg/mL (2 %) injection 200 mg  10 mL IntraDERMal ONCE    iohexoL (OMNIPAQUE) 180 mg iodine/mL injection 10 mL  10 mL Intra artICUlar ONCE          Visit Vitals  BP (!) 150/83 (BP Patient Position: At rest)   Pulse 82   Temp 98.3 °F (36.8 °C)   Resp 18   Ht 5' 5\" (1.651 m)   Wt 69.4 kg (153 lb)   SpO2 96%   BMI 25.46 kg/m²     PE:  Gen: NAD  Head: normocephalic  Heart: RRR  Lungs: CTA crystal  Abd: NT, ND, soft  Neuro: awake and alert  Skin: intact    IMPRESSION:   LS DDD    Note:  The clinical status was discussed in detail with the patient. The procedure was again discussed and described in detail. All understand and accept the planned procedure and risks; reject other forms of treatment. All questions are answered.     Sirena Martinez MD

## 2021-05-25 NOTE — PERIOP NOTES
Tee Franco  1950  [id number]    Situation:  Verbal report given from: K. Eloy Paget, RN  Procedure: Procedure(s):  RIGHT L2 & L3 TFESI    Background:    Preoperative diagnosis: LUMBAR SPONDYLOSIS  DDD    Postoperative diagnosis: LUMBAR SPONDYLOSIS  DDD    :  Dr. Martin Camera): Saman Arellano: Michelle Short  Local Nurse Monitor: Rossy Kearns RN  Radiology Technician: Gracia Garrett  Scrub Tech-1: Yen Nettles    Specimens: * No specimens in log *    Assessment:  Intra-procedure medications         Anesthesia gave intra-procedure sedation and medications, see anesthesia flow sheet     Intravenous fluids: LR@ KVO     Vital signs stable

## 2021-10-11 NOTE — PERIOP NOTES
[facility]  Ambulatory Surgery Unit  Pre-operative Instructions    Procedure Date  Tuesday, October 19, 2021            Tentative Arrival Time 245      1. On the day of your procedure, please report to the Ambulatory Surgery Unit Registration Desk and sign in at your designated time. The Ambulatory Surgery Unit is located in [facility] on the [facility] side of the [facility] across from the [address] [place] [address]. Please have all of your health insurance cards and a photo ID. 2. You must have someone with you to drive you home as directed by your surgeon. 3. You may have a light breakfast and take normal morning medications. 4. We recommend you do not drink any alcoholic beverages for 24 hours before and after your procedure. 5. Contact your surgeons office for instructions on the following medications: non-steroidal anti-inflammatory drugs (i.e. Advil, Aleve), vitamins, and supplements. (Some surgeons will want you to stop these medications prior to surgery and others may allow you to take them)   **If you are currently taking Plavix, Coumadin, Aspirin and/or other blood-thinning agents, contact your surgeon for instructions. ** Your surgeon will partner with the physician prescribing these medications to determine if it is safe to stop or if you need to continue taking. Please do not stop taking these medications without instructions from your surgeon. 6. In an effort to help prevent surgical site infection, we ask that you shower with an anti-bacterial soap (i.e. Dial or Safeguard) on the morning of your procedure. Do not apply any lotions, powders, or deodorants after showering. 7. Wear comfortable clothes. Wear glasses instead of contacts. Do not bring any jewelry or money (other than copays or fees as instructed). Do not wear make-up, particularly mascara, the morning of your procedure. Wear your hair loose or down, no ponytails, buns, jake pins or clips.  All body piercings must be removed. 8. You should understand that if you do not follow these instructions your procedure may be cancelled. If your physical condition changes (i.e. fever, cold or flu) please contact your surgeon as soon as possible. 9. It is important that you be on time. If a situation occurs where you may be late, or if you have any questions or problems, please call [phone].    10. Your procedure time may be subject to change. You will receive a phone call the day prior to confirm your arrival time. I understand a pre-operative phone call will be made to verify my procedure time. In the event that I am not available, I give permission for a message to be left on my answering service and/or with another person?       yes    Preop instructions reviewed  Pt verbalized understanding.      ___________________      ___________________      ___________________  (Signature of Patient)          (Witness)                   (Date and Time)

## 2021-10-15 ENCOUNTER — TRANSCRIBE ORDER (OUTPATIENT)
Dept: REGISTRATION | Age: 71
End: 2021-10-15

## 2021-10-15 ENCOUNTER — HOSPITAL ENCOUNTER (OUTPATIENT)
Dept: GENERAL RADIOLOGY | Age: 71
Discharge: HOME | End: 2021-10-15
Payer: MEDICARE

## 2021-10-15 DIAGNOSIS — M79.605 LEFT LEG PAIN: ICD-10-CM

## 2021-10-15 DIAGNOSIS — M79.605 LEFT LEG PAIN: Primary | ICD-10-CM

## 2021-10-15 PROCEDURE — 73552 X-RAY EXAM OF FEMUR 2/>: CPT

## 2021-10-19 ENCOUNTER — HOSPITAL ENCOUNTER (OUTPATIENT)
Age: 71
Setting detail: OUTPATIENT SURGERY
Discharge: HOME | End: 2021-10-19
Attending: PHYSICAL MEDICINE & REHABILITATION | Admitting: PHYSICAL MEDICINE & REHABILITATION
Payer: MEDICARE

## 2021-10-19 ENCOUNTER — APPOINTMENT (OUTPATIENT)
Dept: GENERAL RADIOLOGY | Age: 71
End: 2021-10-19
Attending: PHYSICAL MEDICINE & REHABILITATION
Payer: MEDICARE

## 2021-10-19 VITALS
DIASTOLIC BLOOD PRESSURE: 84 MMHG | TEMPERATURE: 98.4 F | HEIGHT: 65 IN | HEART RATE: 77 BPM | BODY MASS INDEX: 25.49 KG/M2 | WEIGHT: 153 LBS | OXYGEN SATURATION: 97 % | RESPIRATION RATE: 16 BRPM | SYSTOLIC BLOOD PRESSURE: 164 MMHG

## 2021-10-19 PROCEDURE — 74011000250 HC RX REV CODE- 250: Performed by: PHYSICAL MEDICINE & REHABILITATION

## 2021-10-19 PROCEDURE — 76030000002 HC AMB SURG OR TIME FIRST 0.: Performed by: PHYSICAL MEDICINE & REHABILITATION

## 2021-10-19 PROCEDURE — 74011250636 HC RX REV CODE- 250/636: Performed by: PHYSICAL MEDICINE & REHABILITATION

## 2021-10-19 PROCEDURE — 72020 X-RAY EXAM OF SPINE 1 VIEW: CPT

## 2021-10-19 PROCEDURE — 77030003665 HC NDL SPN BBMI -A: Performed by: PHYSICAL MEDICINE & REHABILITATION

## 2021-10-19 PROCEDURE — 76210000046 HC AMBSU PH II REC FIRST 0.5 HR: Performed by: PHYSICAL MEDICINE & REHABILITATION

## 2021-10-19 PROCEDURE — 74011000636 HC RX REV CODE- 636: Performed by: PHYSICAL MEDICINE & REHABILITATION

## 2021-10-19 PROCEDURE — 2709999900 HC NON-CHARGEABLE SUPPLY: Performed by: PHYSICAL MEDICINE & REHABILITATION

## 2021-10-19 PROCEDURE — 76000 FLUOROSCOPY <1 HR PHYS/QHP: CPT

## 2021-10-19 RX ORDER — METHYLPREDNISOLONE ACETATE 40 MG/ML
40 INJECTION, SUSPENSION INTRA-ARTICULAR; INTRALESIONAL; INTRAMUSCULAR; SOFT TISSUE ONCE
Status: COMPLETED | OUTPATIENT
Start: 2021-10-19 | End: 2021-10-19

## 2021-10-19 RX ORDER — BUPIVACAINE HYDROCHLORIDE 5 MG/ML
5 INJECTION, SOLUTION EPIDURAL; INTRACAUDAL ONCE
Status: DISCONTINUED | OUTPATIENT
Start: 2021-10-19 | End: 2021-10-19 | Stop reason: HOSPADM

## 2021-10-19 RX ORDER — LIDOCAINE HYDROCHLORIDE 20 MG/ML
5 INJECTION, SOLUTION INFILTRATION; PERINEURAL ONCE
Status: COMPLETED | OUTPATIENT
Start: 2021-10-19 | End: 2021-10-19

## 2021-10-19 NOTE — PERIOP NOTES
Patient: Mindy Corcoran MRN: [id number]  SSN: [id number]   YOB: 1950  Age: 66 y.o. Sex: female     Patient is status post Procedure(s):  BILATERAL L3 TRANSFORMINAL EPIDURAL STEROID INJECTION.     Surgeon(s) and Role:     * Danelle Sims MD - Primary    Local/Dose/Irrigation:  SEE MAR                                       Dressing/Packing:     Larrie Drain

## 2021-10-19 NOTE — DISCHARGE INSTRUCTIONS
Dr. Mame Plunkett Discharge Instructions  Transforaminal Epidural Steroid Injection/ Selective Nerve Block    You had a transforaminal epidural steroid injection/ selective nerve block today. You will probably have some numbness, and possibly weakness, in your leg for the next 6 to 8 hours. The steroids will slowly become effective, reducing your pain, over the next 2 weeks. You should begin feeling better after a few days, but it may take up to 2 weeks to notice the difference. The benefit you get from your injection will last a variable amount of time, depending on the severity of your lumbar spine problem.  Pain: Most people do not have any increase in pain after this injection. However, you might experience some soreness in your low back. If this happens, putting an ice pack over the sore area will help.  Bandage: You will have a small bandage covering the site of the injection. You may remove it once you get home.  Restrictions: Someone should drive you home after the injection. After that, you have no restrictions. You need to be careful while walking, as you may still have some numbness or weakness in your leg. You may resume your normal level of activity. You may take a shower or bath, and you may eat normally. You should continue your current exercises and/or therapy routine.   Medications: Continue your current medications as prescribed. If your pain decreases, you may reduce the amount of your pain medicines. If you stopped taking anticoagulants or blood-thinners before the injection, start them tomorrow. If you have diabetes, your blood sugar may be elevated for a few days. Call your primary doctor with any questions.   Call Dr. Mame Plunkett at [phone] if you experience:   Fever (101 degrees Fahrenheit or greater)   Nausea or vomiting   Headache unrelieved by your normal pain medicine   Redness or swelling at the injection site that lasts more than 1 day   New numbness, tingling, weakness, or pain that you didnt have before the injection    Follow-up appointment:   If still having pain in 1-2 weeks, call office at [phone] for a follow up appointment. DISCHARGE SUMMARY from Nurse    The following personal items collected during your admission are returned to you:   Dental Appliance: Dental Appliances: None  Vision: Visual Aid: Glasses  Hearing Aid:    Jewelry:    Clothing:    Other Valuables:    Valuables sent to safe: If you were given prescriptions, please review the written information on prescribed medications. · You will receive a Post Operative Call from one of the Recovery Room Nurses on the day after your surgery to check on you. It is very important for us to know how you are recovering after your surgery. · You may receive an e-mail or letter in the mail from [employer] regarding your experience with us in the Ambulatory Surgery Unit. Your feedback is valuable to us and we appreciate your participation in the survey. If you have not had your influenza or pneumococcal vaccines, please follow up with your primary care physician. The discharge information has been reviewed with the patient. The patient verbalized understanding.

## 2021-10-19 NOTE — OP NOTES
Epidural Steroid Injection Operative Report    Indications: This is a 66 y.o. female who presents with low back pain. She was positive for LS DDD. The patient was admitted for surgery as conservative measures have failed. Date of Surgery: 10/19/2021    Preoperative Diagnosis: LS DDD    Postoperative Diagnosis: LS DDD    Surgeon(s) and Role:     * Chava Ramirez MD - Primary     Procedure:  Procedure(s):  BILATERAL L3 TRANSFORMINAL EPIDURAL STEROID INJECTION    Procedure in Detail:  After appropriate informed consent was obtained, the patient was taken to the operating suite and placed in the prone position on the operating table on appropriate padding. The LS region was prepped and draped in the usual sterile fashion. Intraoperative fluoroscopy was used to localize the LS spine. The skin was infiltrated with 2% lidocaine. An 25-g needle was advanced into the Brandan L3 neuroforamen under fluoroscopic guidance. A small amount of contrast was injected into the epidural space, confirming appropriate needle placement on fluoroscopy. Next, 2ml of 2% lidocaine and 80mg of Depo-Medrol were injected. The needle was removed from the patient. The patient was then turned back into the supine position on the stretcher and was taken to the Recovery Room in stable condition.     Estimated Blood Loss:  none     Specimens: None       Drains: None          Complications:  None    Signed By: Edwin Perze MD                        October 19, 2021

## 2021-10-19 NOTE — PERIOP NOTES
Patient received to PACU, VSS. Patient awake and alert with no complaints of pain. Injection site intact. Neuro:  Push/Pull assessment:        LLE Response: strong   RLE Response: strong     Discharge instructions given. Patient verbalized understanding of instructions and follow up. Patient states ready for discharge - patient discharged at this time by wheelchair with all belongings. Vangie Rowan to provide transportation home.

## 2021-10-19 NOTE — PERIOP NOTES
Neuro:  Push/Pull assessment:     LUE Response: equal/strong    LLE Response: weaker push/pull (drop foot)   RUE Response: equal/strong    RLE Response: stronger push/pull

## 2021-10-19 NOTE — H&P
Procedural Case Note    10/19/2021    (3:06 PM)    Shy Vergara    1950   (66 y.o.)    [id number]    CC:  pain    ROS:   Complete ROS obtained, no CP, no SOB, no N or V    PMH:     Past Medical History:   Diagnosis Date    Arthritis     COVID-19 vaccine series completed 04/21/2021    Moderna    Depression     Foot drop, left     GERD (gastroesophageal reflux disease)     High cholesterol     Hypertension     Nausea & vomiting     Psoriasis     Sleep apnea     as of 5/19/21 unable to tolerate CPAP       ALLERGIES:     Allergies   Allergen Reactions    Demerol [Meperidine] Nausea and Vomiting    Tramadol Unknown (comments)     Unknown reaction       MEDS:     No current facility-administered medications for this encounter. Visit Vitals  /85 (BP 1 Location: Right upper arm, BP Patient Position: At rest)   Pulse 74   Temp 98.9 °F (37.2 °C)   Resp 16   Ht 5' 5\" (1.651 m)   Wt 69.4 kg (153 lb)   SpO2 98%   BMI 25.46 kg/m²     PE:  Gen: NAD  Head: normocephalic  Heart: RRR  Lungs: CTA crystal  Abd: NT, ND, soft  Neuro: awake and alert  Skin: intact    IMPRESSION:   LS DDD    Note:  The clinical status was discussed in detail with the patient. The procedure was again discussed and described in detail. All understand and accept the planned procedure and risks; reject other forms of treatment. All questions are answered.     Enriqueta Frankel MD

## 2022-06-01 NOTE — PERIOP NOTES
[facility] TREATMENT PLAN      Location: [x] [place] [] [place]    Treatment plan:  IISBOS  1837  (6/1/22)    Strengths: Being a mother, Good with people, Task Oriented (Finish what I start)    Weakness/Limitations:  Anxiety, Time Management, Smoking    Service/Frequency/Duration: Individual 1x Week in 90 Days, Group assigned 1x Week in 90 Days, Urinalysis Random in 90 Days, AA/NA 6 in 80 Days, Sponsor As Needed in 90 Days and Case Management As Needed in 90 Days    Diagnosis: Substance use history:  F12.20 Cannabis dependence-unspecified use, F10.10 Nondependent alcohol abuse-unspecified drinking behavior and F14.10 Nondependent cocaine abuse-unspecified use    Level of Care: 1 Outpatient Services    1. Problem:   a. Goal: Abstain from using alcohol in 90 Days   b. Objectives:   i. 1) Identify 3 Indicators of Addiction in 90 Days Evaluation Date: 7/17/22 Code: C Continue TBD   ii. 2) Identify 3 Negative Effects of Substance Use in 90 Days Evaluation Date: 7/17/22 Code: C Continue TBD  iii. 3)  Identify 3 Ways Staying Clean could positively impact your life in 90 Days Evaluation Date: 7/17/22 Code: Achieved  4/20/22 Being present with my children, having a clear mind, getting a better job     2. Problem: Lacks Coping Skills to Maintain Long-term Sobriety   a. Goal: Acquire necessary skills to maintain sobriety in 90 Days   b. Objectives:   i. 1)  Identify 3 Changes that you will make that support your recovery in 90 Days Evaluation Date: 7/17/22 Code: C Continue TBD   ii. 2)  Identify  3 External/Internal Triggers and Sober Responses to them in 90 Days Evaluation Date: 7/17/22 Code: C Continue TBD   iii. 3) 1x Month contact with REACH  for support in 90 Days Evaluation Date: 7/17/22 Code: C Continue TBD     3.     Problem: Lacks Understanding and Knowledge of Addiction   a. Goal: Develop Increase awareness of the Disease of Addiction and Relapse triggers and coping strategies needed to effectively Recommend OV.   Yu Jernigan  1950  [id number]    Situation:  Verbal report given from: Vahe Espana RN  Procedure: Procedure(s):  BILATERAL L3 TRANSFORMINAL EPIDURAL STEROID INJECTION    Background:    Preoperative diagnosis: DDD (degenerative disc disease), lumbar [M51.36]  Sacroiliitis, not elsewhere classified ([facility] [address] [M46.1]  Lumbar spondylosis [M47.816]  Chronic bilateral low back pain with bilateral sciatica [M54.42, M54.41, G89.29]  Spinal stenosis of lumbar region with neurogenic claudication [M48.062]    Postoperative diagnosis: here classified ([facility] [address] [M46.1]  Lumbar spondylosis [M47.816]  Chronic bilateral low back pain with bilateral sciatica [M54.42, M54.41, G89.29]  Spinal stenosis of lumbar region with neurogenic claudication [M48.062]    :  Dr. Janet Appiah:  Intra-procedure medications, procedure, and allergies reviewed        Recommendation:    Discharge patient home after discharge instructions reviewed with patient. Rest until local has worn off. deal with them that support long-term sobriety in 90 Days  b. Objectives:   i. 1) Identify  3 Ways to achieve a quality of life that is free from using all MAS on a continuing bases in 90 Days Evaluation Date: 7/17/22 Code: C Continue TBD  ii. 2) Identify  4 Strategies to manage urges to lapse back into substance use in 90 Days Evaluation Date: 7/17/22 Code: C Continue TBD  iii. 3)  Identify 4 Stages of Recovery in 90 Days Evaluation Date: 7/17/22   Code: C Continue TBD     Defer: Domestic Violence Class    Discharge Plan/Instructions: Complete individualized treatment plan goals and objectives. Comply with CPS and Court recommendations. Maintain sobriety. Vandana Belle / 1995 has participated in the treatment plan development outlined above on 6/1/2022.      JANICE Mari  9/5/0605/5:24 PM

## 2023-03-24 NOTE — DISCHARGE INSTRUCTIONS
Dr. Madison May Discharge Instructions  Transforaminal Epidural Steroid Injection/ Selective Nerve Block    You had a transforaminal epidural steroid injection/ selective nerve block today. You will probably have some numbness, and possibly weakness, in your leg for the next 6 to 8 hours. The steroids will slowly become effective, reducing your pain, over the next 2 weeks. You should begin feeling better after a few days, but it may take up to 2 weeks to notice the difference. The benefit you get from your injection will last a variable amount of time, depending on the severity of your lumbar spine problem.  Pain: Most people do not have any increase in pain after this injection. However, you might experience some soreness in your low back. If this happens, putting an ice pack over the sore area will help.  Bandage: You will have a small bandage covering the site of the injection. You may remove it once you get home.  Restrictions: Someone should drive you home after the injection. After that, you have no restrictions. You need to be careful while walking, as you may still have some numbness or weakness in your leg. You may resume your normal level of activity. You may take a shower or bath, and you may eat normally. You should continue your current exercises and/or therapy routine.   Medications: Continue your current medications as prescribed. If your pain decreases, you may reduce the amount of your pain medicines. If you stopped taking anticoagulants or blood-thinners before the injection, start them tomorrow. If you have diabetes, your blood sugar may be elevated for a few days. Call your primary doctor with any questions.   Call Dr. Madison May at [phone] if you experience:   Fever (101 degrees Fahrenheit or greater)   Nausea or vomiting   Headache unrelieved by your normal pain medicine   Redness or swelling at the injection site that lasts more than 1 day   New numbness, tingling, weakness, Home or pain that you didnt have before the injection    Follow-up appointment:   If still having pain in 1-2 weeks, call office at [phone] for a follow up appointment. DISCHARGE SUMMARY from Nurse    The following personal items collected during your admission are returned to you:   Dental Appliance: Dental Appliances: None  Vision:    Hearing Aid:    Jewelry: Jewelry: None  Clothing: Clothing: With patient  Other Valuables: Other Valuables: Cane(Underneath stretcher)  Valuables sent to safe: If you were given prescriptions, please review the written information on prescribed medications. · You will receive a Post Operative Call from one of the Recovery Room Nurses on the day after your surgery to check on you. It is very important for us to know how you are recovering after your surgery. · You may receive an e-mail or letter in the mail from [employer] regarding your experience with us in the Ambulatory Surgery Unit. Your feedback is valuable to us and we appreciate your participation in the survey. If you have not had your influenza or pneumococcal vaccines, please follow up with your primary care physician. The discharge information has been reviewed with the patient. The patient verbalized understanding.

## 2023-04-05 ENCOUNTER — TRANSCRIBE ORDER (OUTPATIENT)
Dept: SCHEDULING | Age: 73
End: 2023-04-05

## 2023-07-07 RX ORDER — VENLAFAXINE HYDROCHLORIDE 75 MG/1
75 CAPSULE, EXTENDED RELEASE ORAL DAILY
COMMUNITY

## 2023-07-07 RX ORDER — VALSARTAN 160 MG/1
160 TABLET ORAL DAILY
COMMUNITY

## 2023-07-07 RX ORDER — SIMVASTATIN 10 MG
5 TABLET ORAL NIGHTLY
COMMUNITY

## 2023-07-07 ASSESSMENT — PATIENT HEALTH QUESTIONNAIRE - PHQ9
SUM OF ALL RESPONSES TO PHQ QUESTIONS 1-9: 2
2. FEELING DOWN, DEPRESSED OR HOPELESS: 1
SUM OF ALL RESPONSES TO PHQ QUESTIONS 1-9: 2
1. LITTLE INTEREST OR PLEASURE IN DOING THINGS: 1
SUM OF ALL RESPONSES TO PHQ9 QUESTIONS 1 & 2: 2

## 2023-07-07 ASSESSMENT — LIFESTYLE VARIABLES
HOW MANY STANDARD DRINKS CONTAINING ALCOHOL DO YOU HAVE ON A TYPICAL DAY: PATIENT DOES NOT DRINK
HOW OFTEN DO YOU HAVE A DRINK CONTAINING ALCOHOL: NEVER

## 2023-07-13 ENCOUNTER — OFFICE VISIT (OUTPATIENT)
Facility: CLINIC | Age: 73
End: 2023-07-13
Payer: MEDICARE

## 2023-07-13 VITALS
RESPIRATION RATE: 16 BRPM | TEMPERATURE: 98.1 F | HEIGHT: 65 IN | DIASTOLIC BLOOD PRESSURE: 82 MMHG | WEIGHT: 143.4 LBS | HEART RATE: 73 BPM | SYSTOLIC BLOOD PRESSURE: 132 MMHG | OXYGEN SATURATION: 96 % | BODY MASS INDEX: 23.89 KG/M2

## 2023-07-13 DIAGNOSIS — Z00.00 MEDICARE ANNUAL WELLNESS VISIT, SUBSEQUENT: ICD-10-CM

## 2023-07-13 DIAGNOSIS — E78.00 HIGH CHOLESTEROL: ICD-10-CM

## 2023-07-13 DIAGNOSIS — R53.83 FATIGUE, UNSPECIFIED TYPE: ICD-10-CM

## 2023-07-13 DIAGNOSIS — I10 PRIMARY HYPERTENSION: ICD-10-CM

## 2023-07-13 DIAGNOSIS — E55.9 VITAMIN D DEFICIENCY: Primary | ICD-10-CM

## 2023-07-13 PROCEDURE — 3017F COLORECTAL CA SCREEN DOC REV: CPT | Performed by: INTERNAL MEDICINE

## 2023-07-13 PROCEDURE — G0439 PPPS, SUBSEQ VISIT: HCPCS | Performed by: INTERNAL MEDICINE

## 2023-07-13 PROCEDURE — 3074F SYST BP LT 130 MM HG: CPT | Performed by: INTERNAL MEDICINE

## 2023-07-13 PROCEDURE — 1123F ACP DISCUSS/DSCN MKR DOCD: CPT | Performed by: INTERNAL MEDICINE

## 2023-07-13 PROCEDURE — 3078F DIAST BP <80 MM HG: CPT | Performed by: INTERNAL MEDICINE

## 2023-07-13 RX ORDER — KETOROLAC TROMETHAMINE 4 MG/ML
SOLUTION/ DROPS OPHTHALMIC
COMMUNITY

## 2023-07-13 RX ORDER — DICYCLOMINE HCL 20 MG
TABLET ORAL AS NEEDED
COMMUNITY
Start: 2023-04-05

## 2023-07-13 RX ORDER — PREDNISOLONE ACETATE 10 MG/ML
SUSPENSION/ DROPS OPHTHALMIC
COMMUNITY
Start: 2022-05-05

## 2023-07-13 RX ORDER — QUETIAPINE FUMARATE 25 MG/1
25 TABLET, FILM COATED ORAL NIGHTLY
COMMUNITY
Start: 2023-06-20

## 2023-07-13 RX ORDER — ASCORBIC ACID 500 MG
500 TABLET ORAL
COMMUNITY

## 2023-07-13 NOTE — PROGRESS NOTES
Adilson Delgado is a 74 y.o. female presenting for Medicare AWV  . 1. Have you been to the ER, urgent care clinic since your last visit? Hospitalized since your last visit? No    2. Have you seen or consulted any other health care providers outside of the [address] [place] [address] since your last visit? Include any pap smears or colon screening.  No

## 2023-07-28 ENCOUNTER — ANESTHESIA EVENT (OUTPATIENT)
Facility: HOSPITAL | Age: 73
End: 2023-07-28
Payer: MEDICARE

## 2023-07-28 ENCOUNTER — ANESTHESIA (OUTPATIENT)
Facility: HOSPITAL | Age: 73
End: 2023-07-28
Payer: MEDICARE

## 2023-07-28 ENCOUNTER — HOSPITAL ENCOUNTER (OUTPATIENT)
Facility: HOSPITAL | Age: 73
Setting detail: OUTPATIENT SURGERY
Discharge: HOME | End: 2023-07-28
Attending: INTERNAL MEDICINE | Admitting: INTERNAL MEDICINE
Payer: MEDICARE

## 2023-07-28 VITALS
RESPIRATION RATE: 12 BRPM | WEIGHT: 138.2 LBS | BODY MASS INDEX: 23.03 KG/M2 | HEIGHT: 65 IN | SYSTOLIC BLOOD PRESSURE: 173 MMHG | OXYGEN SATURATION: 93 % | HEART RATE: 74 BPM | TEMPERATURE: 97.6 F | DIASTOLIC BLOOD PRESSURE: 97 MMHG

## 2023-07-28 PROCEDURE — 88305 TISSUE EXAM BY PATHOLOGIST: CPT

## 2023-07-28 PROCEDURE — 2580000003 HC RX 258: Performed by: INTERNAL MEDICINE

## 2023-07-28 PROCEDURE — 2720000010 HC SURG SUPPLY STERILE: Performed by: INTERNAL MEDICINE

## 2023-07-28 PROCEDURE — 7100000011 HC PHASE II RECOVERY - ADDTL 15 MIN: Performed by: INTERNAL MEDICINE

## 2023-07-28 PROCEDURE — 2709999900 HC NON-CHARGEABLE SUPPLY: Performed by: INTERNAL MEDICINE

## 2023-07-28 PROCEDURE — 3600007512: Performed by: INTERNAL MEDICINE

## 2023-07-28 PROCEDURE — 2500000003 HC RX 250 WO HCPCS: Performed by: ANESTHESIOLOGY

## 2023-07-28 PROCEDURE — 7100000010 HC PHASE II RECOVERY - FIRST 15 MIN: Performed by: INTERNAL MEDICINE

## 2023-07-28 PROCEDURE — 3700000000 HC ANESTHESIA ATTENDED CARE: Performed by: INTERNAL MEDICINE

## 2023-07-28 PROCEDURE — 3700000001 HC ADD 15 MINUTES (ANESTHESIA): Performed by: INTERNAL MEDICINE

## 2023-07-28 PROCEDURE — 6360000002 HC RX W HCPCS: Performed by: ANESTHESIOLOGY

## 2023-07-28 PROCEDURE — 3600007502: Performed by: INTERNAL MEDICINE

## 2023-07-28 RX ORDER — SODIUM CHLORIDE 9 MG/ML
25 INJECTION, SOLUTION INTRAVENOUS PRN
Status: DISCONTINUED | OUTPATIENT
Start: 2023-07-28 | End: 2023-07-28 | Stop reason: HOSPADM

## 2023-07-28 RX ORDER — LIDOCAINE HYDROCHLORIDE 20 MG/ML
INJECTION, SOLUTION EPIDURAL; INFILTRATION; INTRACAUDAL; PERINEURAL PRN
Status: DISCONTINUED | OUTPATIENT
Start: 2023-07-28 | End: 2023-07-28 | Stop reason: SDUPTHER

## 2023-07-28 RX ORDER — SODIUM CHLORIDE 0.9 % (FLUSH) 0.9 %
5-40 SYRINGE (ML) INJECTION EVERY 12 HOURS SCHEDULED
Status: DISCONTINUED | OUTPATIENT
Start: 2023-07-28 | End: 2023-07-28 | Stop reason: HOSPADM

## 2023-07-28 RX ORDER — SODIUM CHLORIDE 0.9 % (FLUSH) 0.9 %
5-40 SYRINGE (ML) INJECTION PRN
Status: DISCONTINUED | OUTPATIENT
Start: 2023-07-28 | End: 2023-07-28 | Stop reason: HOSPADM

## 2023-07-28 RX ADMIN — LIDOCAINE HYDROCHLORIDE 40 MG: 20 INJECTION, SOLUTION EPIDURAL; INFILTRATION; INTRACAUDAL; PERINEURAL at 11:23

## 2023-07-28 RX ADMIN — PROPOFOL 220 MG: 10 INJECTION, EMULSION INTRAVENOUS at 11:46

## 2023-07-28 RX ADMIN — SODIUM CHLORIDE 25 ML: 9 INJECTION, SOLUTION INTRAVENOUS at 10:40

## 2023-07-28 ASSESSMENT — PAIN - FUNCTIONAL ASSESSMENT: PAIN_FUNCTIONAL_ASSESSMENT: 0-10

## 2023-07-28 NOTE — ANESTHESIA POSTPROCEDURE EVALUATION
Department of Anesthesiology  Postprocedure Note    Patient: Jose Hare  MRN: [id number]  YOB: 1950  Date of evaluation: 7/28/2023      Procedure Summary     Date: 07/28/23 Room / Location: [facility] ENDO 03 / [facility] ENDOSCOPY    Anesthesia Start: 9886 Anesthesia Stop: [id number]    Procedure: COLONOSCOPY DIAGNOSTIC  with polypectomy with biopsy (Lower GI Region) Diagnosis:       Change in bowel habits      (Change in bowel habits [R19.4])    Surgeons: Rajni Samuel MD Responsible Provider: Diomedes George DO    Anesthesia Type: MAC ASA Status: 2          Anesthesia Type: MAC    Pallavi Phase I: Pallavi Score: 10    Pallavi Phase II: Pallavi Score: 10      Anesthesia Post Evaluation    Patient location during evaluation: PACU  Patient participation: complete - patient participated  Level of consciousness: awake  Airway patency: patent  Nausea & Vomiting: no vomiting and no nausea  Complications: no  Cardiovascular status: hemodynamically stable  Respiratory status: acceptable  Hydration status: euvolemic

## 2023-07-28 NOTE — PERIOP NOTE
ARRIVAL INFORMATION:  Verified patient name and date of birth, scheduled procedure, and informed consent. : Desi Myles (boyfriend) contact number: [phone]  Physician and staff can share information with the . Belongings with patient include:  Clothing,None        GI FOCUSED ASSESSMENT:  Neuro: Awake, alert, oriented x4  Respiratory: even and unlabored   GI: soft and non-distended  EKG Rhythm: normal sinus rhythm    Education:Reviewed general discharge instructions and  information.

## 2023-07-28 NOTE — H&P
Gastroenterology Outpatient History and Physical    Patient: Avril Gregory    Physician: Chayo Julio MD    Chief Complaint: Change in bowel habits  History of Present Illness: No other GI comlaints    History:  Past Medical History:   Diagnosis Date    Arthritis     COVID-19 vaccine series completed 2021    Moderna    Depression     Foot drop, left     GERD (gastroesophageal reflux disease)     High cholesterol     Hypertension     Hypertension     Nausea & vomiting     Psoriasis     Sleep apnea     as of 19 unable to tolerate CPAP      Past Surgical History:   Procedure Laterality Date    BLADDER SUSPENSION       SECTION      CHOLECYSTECTOMY      COLONOSCOPY N/A 10/17/2019    COLONOSCOPY performed by Vasile Rogel MD at [facility] ENDOSCOPY    DILATION AND CURETTAGE OF UTERUS      HEENT Bilateral     retinal reattachment with bands r x2, lx1    TUBAL LIGATION        Social History     Socioeconomic History    Marital status:      Spouse name: None    Number of children: None    Years of education: None    Highest education level: None   Tobacco Use    Smoking status: Never    Smokeless tobacco: Never   Vaping Use    Vaping Use: Never used   Substance and Sexual Activity    Alcohol use: No    Drug use: No    Sexual activity: Not Currently     Social Determinants of Health     Physical Activity: Inactive    Days of Exercise per Week: 0 days    Minutes of Exercise per Session: 0 min      Family History   Problem Relation Age of Onset    Cancer Maternal Aunt         breast     Breast Cancer Maternal Aunt     Other Mother         scleraderma    Heart Disease Father       Patient Active Problem List   Diagnosis    Lumbar post-laminectomy syndrome    Lumbosacral stenosis with neurogenic claudication    Hypertension    High cholesterol       Allergies: No Known Allergies  Medications:   Prior to Admission medications    Medication Sig Start Date End Date Taking?  Authorizing Provider   QUEtiapine

## 2023-07-28 NOTE — PERIOP NOTE
Endoscopy Case End Note:    1149:  Procedure scope was pre-cleaned, per protocol, at bedside by Gabrielle MILLER      3631:  Report received from anesthesia - Dr Elena Christianson. See anesthesia flowsheet for intra-procedure vital signs and events.

## 2023-07-28 NOTE — OP NOTE
NAME:  Eliana Spence   :   1950   MRN:   [id number]     Date/Time:  2023 11:48 AM    Colonoscopy Operative Report    Procedure Type:   Colonoscopy with biopsy, polypectomy (cold snare)     Indications:     Change in bowel habits  Pre-operative Diagnosis: see indication above  Post-operative Diagnosis:  See findings below  :  Amy Schmidt MD  Referring Provider: --No primary care provider on file. Exam:  Airway: clear, no airway problems anticipated  Heart: RRR, without gallops or rubs  Lungs: clear bilaterally without wheezes, crackles, or rhonchi  Abdomen: soft, nontender, nondistended, bowel sounds present  Mental Status: awake, alert and oriented to person, place and time    Sedation:  MAC anesthesia Propofol  Procedure Details:  After informed consent was obtained with all risks and benefits of procedure explained and preoperative exam completed, the patient was taken to the endoscopy suite and placed in the left lateral decubitus position. Upon sequential sedation as per above, a digital rectal exam was performed demonstrating internal hemorrhoids. The Olympus videocolonoscope  was inserted in the rectum and carefully advanced to the cecum, which was identified by the ileocecal valve and appendiceal orifice. The quality of preparation was fair. The colonoscope was slowly withdrawn with careful evaluation between folds. Retroflexion in the rectum was completed demonstrating internal hemorrhoids. Findings:   10 mm sessile polyp in cecum. Removed by cold snare polypectomy  10 mm sessile polyp in ascending colon. Removed by cold snare polypectomy  Moderate pan-diverticulosis  Small internal hemorrhoids seen on retroflexion  Otherwise normal colonoscopy through to the cecum. Biopsies taken of whole colon to evaluate for microscopic colitis. Note bowel preparation was suboptimal    Specimen Removed:  1. Cecal polyp 2. Whole colon 3. Ascending colon polyp  Complications: None. EBL:  None. Impression:    10 mm sessile polyp in cecum. Removed by cold snare polypectomy  10 mm sessile polyp in ascending colon. Removed by cold snare polypectomy  Moderate pan-diverticulosis  Small internal hemorrhoids seen on retroflexion  Otherwise normal colonoscopy through to the cecum. Biopsies taken of whole colon to evaluate for microscopic colitis. Note bowel preparation was suboptimal    Recommendations: Follow up pathology  Repeat colonoscopy in 3 years with extended bowel preparation    Discharge Disposition:  Home in the company of a  when able to ambulate.       Raad Vasquez MD

## 2023-08-14 NOTE — PATIENT INSTRUCTIONS
screenings as appropriate. After reviewing your medical record and screening and assessments performed today your provider may have ordered immunizations, labs, imaging, and/or referrals for you. A list of these orders (if applicable) as well as your Preventive Care list are included within your After Visit Summary for your review. Other Preventive Recommendations:    A preventive eye exam performed by an eye specialist is recommended every 1-2 years to screen for glaucoma; cataracts, macular degeneration, and other eye disorders. A preventive dental visit is recommended every 6 months. Try to get at least 150 minutes of exercise per week or 10,000 steps per day on a pedometer . Order or download the FREE \"Exercise & Physical Activity: Your Everyday Guide\" from [employer]. Call 0-809.407.5906 or search [employer] online. You need 3625-5774 mg of calcium and 1928-8103 IU of vitamin D per day. It is possible to meet your calcium requirement with diet alone, but a vitamin D supplement is usually necessary to meet this goal.  When exposed to the sun, use a sunscreen that protects against both UVA and UVB radiation with an SPF of 30 or greater. Reapply every 2 to 3 hours or after sweating, drying off with a towel, or swimming. Always wear a seat belt when traveling in a car. Always wear a helmet when riding a bicycle or motorcycle.

## 2023-09-27 ENCOUNTER — OFFICE VISIT (OUTPATIENT)
Facility: CLINIC | Age: 73
End: 2023-09-27
Payer: MEDICARE

## 2023-09-27 VITALS
DIASTOLIC BLOOD PRESSURE: 80 MMHG | OXYGEN SATURATION: 97 % | WEIGHT: 135.6 LBS | SYSTOLIC BLOOD PRESSURE: 132 MMHG | TEMPERATURE: 98.2 F | HEIGHT: 65 IN | HEART RATE: 84 BPM | RESPIRATION RATE: 20 BRPM | BODY MASS INDEX: 22.59 KG/M2

## 2023-09-27 DIAGNOSIS — R19.7 DIARRHEA, UNSPECIFIED TYPE: Primary | ICD-10-CM

## 2023-09-27 PROCEDURE — G8427 DOCREV CUR MEDS BY ELIG CLIN: HCPCS | Performed by: INTERNAL MEDICINE

## 2023-09-27 PROCEDURE — 1123F ACP DISCUSS/DSCN MKR DOCD: CPT | Performed by: INTERNAL MEDICINE

## 2023-09-27 PROCEDURE — G8420 CALC BMI NORM PARAMETERS: HCPCS | Performed by: INTERNAL MEDICINE

## 2023-09-27 PROCEDURE — 3075F SYST BP GE 130 - 139MM HG: CPT | Performed by: INTERNAL MEDICINE

## 2023-09-27 PROCEDURE — 3079F DIAST BP 80-89 MM HG: CPT | Performed by: INTERNAL MEDICINE

## 2023-09-27 PROCEDURE — G8400 PT W/DXA NO RESULTS DOC: HCPCS | Performed by: INTERNAL MEDICINE

## 2023-09-27 PROCEDURE — 1090F PRES/ABSN URINE INCON ASSESS: CPT | Performed by: INTERNAL MEDICINE

## 2023-09-27 PROCEDURE — 3017F COLORECTAL CA SCREEN DOC REV: CPT | Performed by: INTERNAL MEDICINE

## 2023-09-27 PROCEDURE — 99213 OFFICE O/P EST LOW 20 MIN: CPT | Performed by: INTERNAL MEDICINE

## 2023-09-27 PROCEDURE — 1036F TOBACCO NON-USER: CPT | Performed by: INTERNAL MEDICINE

## 2023-09-27 RX ORDER — CHOLESTYRAMINE 4 G/9G
1 POWDER, FOR SUSPENSION ORAL 2 TIMES DAILY
Qty: 60 PACKET | Refills: 3 | Status: SHIPPED | OUTPATIENT
Start: 2023-09-27

## 2023-09-27 NOTE — PROGRESS NOTES
Xiomara Marinelli is a 74 y.o. female presenting for Abdominal Pain and Diarrhea  . 1. Have you been to the ER, urgent care clinic since your last visit? Hospitalized since your last visit? No    2. Have you seen or consulted any other health care providers outside of the [address] [place] [address] since your last visit? Include any pap smears or colon screening.   GI , 7-28-23 Colonoscopy

## 2023-09-27 NOTE — PROGRESS NOTES
Randy Rosado is a 74 y.o. female and presents with Abdominal Pain and Diarrhea  . Subjective:  Melina Rivera presents today with complaint of abdominal discomfort and diarrhea. Her symptoms have been present on and off for the past year or longer. She has been seen by gastroenterology and recently underwent a colonoscopy in July of this year that showed no evidence for colitis by pathology and 1 tubulovillous adenoma which was removed. She does have a history of cholecystectomy previously. She was prescribed Bentyl which she has taken on a few occasions and has been helpful at times. Her symptoms however seem to be persistent and at times will become much more severe. This is always triggered by eating. It does not matter what type of food she is eating. Her stools are described as loose and occur typically within a few minutes to an hour after eating. She can also have nausea, diaphoresis, and abdominal cramping. She notes some bloating at times as well.     Past Medical History:   Diagnosis Date    Arthritis     COVID-19 vaccine series completed 2021    Moderna    Depression     Foot drop, left     GERD (gastroesophageal reflux disease)     High cholesterol     Hypertension     Hypertension     Nausea & vomiting     Psoriasis     Sleep apnea     as of 19 unable to tolerate CPAP     Past Surgical History:   Procedure Laterality Date    BLADDER SUSPENSION       SECTION      CHOLECYSTECTOMY      COLONOSCOPY N/A 10/17/2019    COLONOSCOPY performed by Halle Rush MD at [facility] ENDOSCOPY    COLONOSCOPY N/A 2023    COLONOSCOPY DIAGNOSTIC  with polypectomy with biopsy performed by Morenita Rizzo MD at [address] [place] [address] Bilateral     retinal reattachment with bands r x2, lx1    TUBAL LIGATION       No Known Allergies  Current Outpatient Medications   Medication Sig Dispense Refill    cholestyramine (QUESTRAN) 4 g packet Take 1 packet by

## 2023-09-28 LAB
ALBUMIN SERPL-MCNC: 4.2 G/DL (ref 3.5–5)
ALBUMIN/GLOB SERPL: 1.3 (ref 1.1–2.2)
ALP SERPL-CCNC: 76 U/L (ref 45–117)
ALT SERPL-CCNC: 24 U/L (ref 12–78)
ANION GAP SERPL CALC-SCNC: 5 MMOL/L (ref 5–15)
AST SERPL-CCNC: 23 U/L (ref 15–37)
BASOPHILS # BLD: 0 K/UL (ref 0–0.1)
BASOPHILS NFR BLD: 0 % (ref 0–1)
BILIRUB SERPL-MCNC: 0.6 MG/DL (ref 0.2–1)
BUN SERPL-MCNC: 13 MG/DL (ref 6–20)
BUN/CREAT SERPL: 18 (ref 12–20)
CALCIUM SERPL-MCNC: 11.4 MG/DL (ref 8.5–10.1)
CHLORIDE SERPL-SCNC: 106 MMOL/L (ref 97–108)
CO2 SERPL-SCNC: 30 MMOL/L (ref 21–32)
CREAT SERPL-MCNC: 0.74 MG/DL (ref 0.55–1.02)
DIFFERENTIAL METHOD BLD: ABNORMAL
EOSINOPHIL # BLD: 0.2 K/UL (ref 0–0.4)
EOSINOPHIL NFR BLD: 2 % (ref 0–7)
ERYTHROCYTE [DISTWIDTH] IN BLOOD BY AUTOMATED COUNT: 12.8 % (ref 11.5–14.5)
GLOBULIN SER CALC-MCNC: 3.3 G/DL (ref 2–4)
GLUCOSE SERPL-MCNC: 83 MG/DL (ref 65–100)
HCT VFR BLD AUTO: 49.8 % (ref 35–47)
HGB BLD-MCNC: 15.3 G/DL (ref 11.5–16)
IMM GRANULOCYTES # BLD AUTO: 0 K/UL (ref 0–0.04)
IMM GRANULOCYTES NFR BLD AUTO: 0 % (ref 0–0.5)
LIPASE SERPL-CCNC: 103 U/L (ref 73–393)
LYMPHOCYTES # BLD: 3.1 K/UL (ref 0.8–3.5)
LYMPHOCYTES NFR BLD: 33 % (ref 12–49)
MCH RBC QN AUTO: 30.2 PG (ref 26–34)
MCHC RBC AUTO-ENTMCNC: 30.7 G/DL (ref 30–36.5)
MCV RBC AUTO: 98.4 FL (ref 80–99)
MONOCYTES # BLD: 0.9 K/UL (ref 0–1)
MONOCYTES NFR BLD: 10 % (ref 5–13)
NEUTS SEG # BLD: 5.1 K/UL (ref 1.8–8)
NEUTS SEG NFR BLD: 55 % (ref 32–75)
NRBC # BLD: 0 K/UL (ref 0–0.01)
NRBC BLD-RTO: 0 PER 100 WBC
PLATELET # BLD AUTO: 252 K/UL (ref 150–400)
PMV BLD AUTO: 10.2 FL (ref 8.9–12.9)
POTASSIUM SERPL-SCNC: 3.7 MMOL/L (ref 3.5–5.1)
PROT SERPL-MCNC: 7.5 G/DL (ref 6.4–8.2)
RBC # BLD AUTO: 5.06 M/UL (ref 3.8–5.2)
SODIUM SERPL-SCNC: 141 MMOL/L (ref 136–145)
WBC # BLD AUTO: 9.3 K/UL (ref 3.6–11)

## 2023-09-30 LAB
ENDOMYSIUM IGA SER QL: NEGATIVE
IGA SERPL-MCNC: 376 MG/DL (ref 64–422)
TTG IGA SER-ACNC: <2 U/ML (ref 0–3)

## 2023-10-16 ENCOUNTER — NURSE ONLY (OUTPATIENT)
Facility: CLINIC | Age: 73
End: 2023-10-16

## 2023-10-16 DIAGNOSIS — E87.5 HYPERKALEMIA: ICD-10-CM

## 2023-10-17 LAB
CA-I SERPL ISE-MCNC: 6.3 MG/DL (ref 4.5–5.6)
CALCIUM SERPL-MCNC: 11.5 MG/DL (ref 8.5–10.1)
PTH-INTACT SERPL-MCNC: 92.6 PG/ML (ref 18.4–88)

## 2023-10-30 ENCOUNTER — OFFICE VISIT (OUTPATIENT)
Facility: CLINIC | Age: 73
End: 2023-10-30
Payer: MEDICARE

## 2023-10-30 VITALS
HEART RATE: 76 BPM | TEMPERATURE: 98 F | DIASTOLIC BLOOD PRESSURE: 70 MMHG | OXYGEN SATURATION: 96 % | BODY MASS INDEX: 22.89 KG/M2 | HEIGHT: 65 IN | SYSTOLIC BLOOD PRESSURE: 116 MMHG | WEIGHT: 137.4 LBS | RESPIRATION RATE: 16 BRPM

## 2023-10-30 DIAGNOSIS — E21.3 HYPERPARATHYROIDISM (HCC): ICD-10-CM

## 2023-10-30 DIAGNOSIS — I10 PRIMARY HYPERTENSION: Primary | ICD-10-CM

## 2023-10-30 DIAGNOSIS — K58.0 IRRITABLE BOWEL SYNDROME WITH DIARRHEA: ICD-10-CM

## 2023-10-30 PROCEDURE — G8400 PT W/DXA NO RESULTS DOC: HCPCS | Performed by: INTERNAL MEDICINE

## 2023-10-30 PROCEDURE — 1090F PRES/ABSN URINE INCON ASSESS: CPT | Performed by: INTERNAL MEDICINE

## 2023-10-30 PROCEDURE — 99213 OFFICE O/P EST LOW 20 MIN: CPT | Performed by: INTERNAL MEDICINE

## 2023-10-30 PROCEDURE — 1036F TOBACCO NON-USER: CPT | Performed by: INTERNAL MEDICINE

## 2023-10-30 PROCEDURE — 3074F SYST BP LT 130 MM HG: CPT | Performed by: INTERNAL MEDICINE

## 2023-10-30 PROCEDURE — 3017F COLORECTAL CA SCREEN DOC REV: CPT | Performed by: INTERNAL MEDICINE

## 2023-10-30 PROCEDURE — G8420 CALC BMI NORM PARAMETERS: HCPCS | Performed by: INTERNAL MEDICINE

## 2023-10-30 PROCEDURE — 1123F ACP DISCUSS/DSCN MKR DOCD: CPT | Performed by: INTERNAL MEDICINE

## 2023-10-30 PROCEDURE — G8484 FLU IMMUNIZE NO ADMIN: HCPCS | Performed by: INTERNAL MEDICINE

## 2023-10-30 PROCEDURE — 3078F DIAST BP <80 MM HG: CPT | Performed by: INTERNAL MEDICINE

## 2023-10-30 PROCEDURE — G8427 DOCREV CUR MEDS BY ELIG CLIN: HCPCS | Performed by: INTERNAL MEDICINE

## 2023-10-30 RX ORDER — QUETIAPINE FUMARATE 25 MG/1
25 TABLET, FILM COATED ORAL DAILY
COMMUNITY

## 2023-10-30 RX ORDER — SIMVASTATIN 10 MG
10 TABLET ORAL NIGHTLY
COMMUNITY

## 2023-10-30 NOTE — PROGRESS NOTES
Giuliana Storey is a 74 y.o. female presenting for 1 Month Follow-Up  . 1. Have you been to the ER, urgent care clinic since your last visit? Hospitalized since your last visit? No    2. Have you seen or consulted any other health care providers outside of the [address] [place] [address] since your last visit? Include any pap smears or colon screening.  No
Take by mouth daily      QUEtiapine (SEROQUEL) 25 MG tablet Take 1 tablet by mouth daily      Multiple Vitamins-Minerals (CENTRUM WOMEN) TABS Take by mouth daily      cholestyramine (QUESTRAN) 4 g packet Take 1 packet by mouth 2 times daily 60 packet 3    dicyclomine (BENTYL) 20 MG tablet as needed      valsartan (DIOVAN) 160 MG tablet Take 1 tablet by mouth daily      prednisoLONE acetate (PRED FORTE) 1 % ophthalmic suspension  (Patient not taking: Reported on 10/30/2023)      ketorolac 0.4 % SOLN ophthalmic solution  (Patient not taking: Reported on 10/30/2023)       No current facility-administered medications for this visit. Social History     Socioeconomic History    Marital status:      Spouse name: None    Number of children: None    Years of education: None    Highest education level: None   Tobacco Use    Smoking status: Never    Smokeless tobacco: Never   Vaping Use    Vaping Use: Never used   Substance and Sexual Activity    Alcohol use: No    Drug use: No    Sexual activity: Not Currently     Social Determinants of Health     Physical Activity: Inactive (7/7/2023)    Exercise Vital Sign     Days of Exercise per Week: 0 days     Minutes of Exercise per Session: 0 min     Family History   Problem Relation Age of Onset    Cancer Maternal Aunt         breast     Breast Cancer Maternal Aunt     Other Mother         scleraderma    Heart Disease Father        Review of Systems  Constitutional:  negative for fevers, chills, anorexia and weight loss  Eyes:    negative for visual disturbance and irritation  ENT:    negative for tinnitus,sore throat,nasal congestion,ear pains. hoarseness  Respiratory:     negative for cough, hemoptysis, dyspnea,wheezing  CV:    negative for chest pain, palpitations, lower extremity edema  GI:    negative for nausea, vomiting, diarrhea, abdominal pain,melena  Endo:               negative for polyuria,polydipsia,polyphagia,heat intolerance  Genitourinary : negative for

## 2023-11-20 RX ORDER — VENLAFAXINE HYDROCHLORIDE 75 MG/1
75 CAPSULE, EXTENDED RELEASE ORAL DAILY
Qty: 90 CAPSULE | Refills: 3 | Status: SHIPPED | OUTPATIENT
Start: 2023-11-20

## 2023-11-20 RX ORDER — SIMVASTATIN 10 MG
5 TABLET ORAL NIGHTLY
Qty: 90 TABLET | Refills: 3 | Status: SHIPPED | OUTPATIENT
Start: 2023-11-20

## 2023-11-20 RX ORDER — VALSARTAN 160 MG/1
160 TABLET ORAL DAILY
Qty: 90 TABLET | Refills: 3 | Status: SHIPPED | OUTPATIENT
Start: 2023-11-20

## 2023-11-20 NOTE — TELEPHONE ENCOUNTER
valsartan (DIOVAN) 160 MG tablet        Sig - Route: Take 1 tablet by mouth daily - Oral    Qty 90  3 refills    Venlafaxine HCl (EFFEXOR PO)        Sig - Route: Take by mouth daily - Oral    Qty 90  3 refills    simvastatin (ZOCOR) 10 MG tablet        Sig - Route:  Take 1 tablet by mouth nightly - Oral    Qty 90  3 refills    Last visit: 10/30/23  Future appt: 4/29/24    Pharmacy [address] [place] [address]

## 2023-12-20 PROBLEM — M46.1 SACROILIITIS, NOT ELSEWHERE CLASSIFIED (HCC): Status: ACTIVE | Noted: 2023-12-20

## 2024-01-24 ENCOUNTER — OFFICE VISIT (OUTPATIENT)
Facility: CLINIC | Age: 74
End: 2024-01-24
Payer: OTHER GOVERNMENT

## 2024-01-24 DIAGNOSIS — J20.9 ACUTE BRONCHITIS, UNSPECIFIED ORGANISM: Primary | ICD-10-CM

## 2024-01-24 PROCEDURE — 1036F TOBACCO NON-USER: CPT | Performed by: INTERNAL MEDICINE

## 2024-01-24 PROCEDURE — G8484 FLU IMMUNIZE NO ADMIN: HCPCS | Performed by: INTERNAL MEDICINE

## 2024-01-24 PROCEDURE — G8427 DOCREV CUR MEDS BY ELIG CLIN: HCPCS | Performed by: INTERNAL MEDICINE

## 2024-01-24 PROCEDURE — 99213 OFFICE O/P EST LOW 20 MIN: CPT | Performed by: INTERNAL MEDICINE

## 2024-01-24 PROCEDURE — 3078F DIAST BP <80 MM HG: CPT | Performed by: INTERNAL MEDICINE

## 2024-01-24 PROCEDURE — 1090F PRES/ABSN URINE INCON ASSESS: CPT | Performed by: INTERNAL MEDICINE

## 2024-01-24 PROCEDURE — G8400 PT W/DXA NO RESULTS DOC: HCPCS | Performed by: INTERNAL MEDICINE

## 2024-01-24 PROCEDURE — 3017F COLORECTAL CA SCREEN DOC REV: CPT | Performed by: INTERNAL MEDICINE

## 2024-01-24 PROCEDURE — G8420 CALC BMI NORM PARAMETERS: HCPCS | Performed by: INTERNAL MEDICINE

## 2024-01-24 PROCEDURE — 1123F ACP DISCUSS/DSCN MKR DOCD: CPT | Performed by: INTERNAL MEDICINE

## 2024-01-24 PROCEDURE — 3074F SYST BP LT 130 MM HG: CPT | Performed by: INTERNAL MEDICINE

## 2024-01-24 RX ORDER — CEFDINIR 300 MG/1
300 CAPSULE ORAL 2 TIMES DAILY
Qty: 20 CAPSULE | Refills: 0 | Status: SHIPPED | OUTPATIENT
Start: 2024-01-24 | End: 2024-02-03

## 2024-01-25 VITALS
BODY MASS INDEX: 23.13 KG/M2 | HEART RATE: 64 BPM | SYSTOLIC BLOOD PRESSURE: 126 MMHG | DIASTOLIC BLOOD PRESSURE: 70 MMHG | HEIGHT: 65 IN | RESPIRATION RATE: 20 BRPM | OXYGEN SATURATION: 96 % | WEIGHT: 138.8 LBS | TEMPERATURE: 98.1 F

## 2024-01-25 NOTE — PROGRESS NOTES
Grisel Palacio is a 73 y.o. female presenting for Cough and Congestion  .     \"Have you been to the ER, urgent care clinic since your last visit?  Hospitalized since your last visit?\"    NO    “Have you seen or consulted any other health care providers outside of [facility] since your last visit?”    NO

## 2024-01-29 DIAGNOSIS — R19.7 DIARRHEA, UNSPECIFIED TYPE: ICD-10-CM

## 2024-01-29 RX ORDER — CHOLESTYRAMINE 4 G/9G
POWDER, FOR SUSPENSION ORAL
Qty: 180 EACH | Refills: 0 | Status: SHIPPED | OUTPATIENT
Start: 2024-01-29

## 2024-01-29 RX ORDER — CHOLESTYRAMINE 4 G/9G
POWDER, FOR SUSPENSION ORAL
Qty: 60 EACH | Refills: 1 | Status: SHIPPED | OUTPATIENT
Start: 2024-01-29 | End: 2024-01-29

## 2024-01-29 NOTE — TELEPHONE ENCOUNTER
Last Refill: 9-27-23  Last Visit: 1/24/2024   Next Visit: 4/29/2024     Requested Prescriptions     Pending Prescriptions Disp Refills    cholestyramine (QUESTRAN) 4 g packet [Pharmacy Med Name: CHOLESTYRAMINE 4GM PACKETS 60S] 60 each 1     Sig: MIX AND DRINK 1 PACKET BY MOUTH TWICE DAILY

## 2024-02-27 ENCOUNTER — OFFICE VISIT (OUTPATIENT)
Facility: CLINIC | Age: 74
End: 2024-02-27
Payer: MEDICARE

## 2024-02-27 VITALS
OXYGEN SATURATION: 95 % | DIASTOLIC BLOOD PRESSURE: 78 MMHG | WEIGHT: 137.8 LBS | TEMPERATURE: 98.2 F | HEART RATE: 72 BPM | RESPIRATION RATE: 16 BRPM | HEIGHT: 65 IN | SYSTOLIC BLOOD PRESSURE: 120 MMHG | BODY MASS INDEX: 22.96 KG/M2

## 2024-02-27 DIAGNOSIS — E78.00 HIGH CHOLESTEROL: ICD-10-CM

## 2024-02-27 DIAGNOSIS — R51.9 CHRONIC NONINTRACTABLE HEADACHE, UNSPECIFIED HEADACHE TYPE: ICD-10-CM

## 2024-02-27 DIAGNOSIS — E21.3 HYPERPARATHYROIDISM (HCC): Primary | ICD-10-CM

## 2024-02-27 DIAGNOSIS — E83.42 HYPOMAGNESEMIA: ICD-10-CM

## 2024-02-27 DIAGNOSIS — G89.29 CHRONIC NONINTRACTABLE HEADACHE, UNSPECIFIED HEADACHE TYPE: ICD-10-CM

## 2024-02-27 DIAGNOSIS — E55.9 VITAMIN D DEFICIENCY: ICD-10-CM

## 2024-02-27 DIAGNOSIS — R51.9 NONINTRACTABLE HEADACHE, UNSPECIFIED CHRONICITY PATTERN, UNSPECIFIED HEADACHE TYPE: ICD-10-CM

## 2024-02-27 PROBLEM — F33.1 MAJOR DEPRESSIVE DISORDER, RECURRENT, MODERATE (HCC): Status: ACTIVE | Noted: 2024-02-27

## 2024-02-27 PROBLEM — F33.0 MAJOR DEPRESSIVE DISORDER, RECURRENT, MILD (HCC): Status: ACTIVE | Noted: 2024-02-27

## 2024-02-27 PROBLEM — F33.9 MAJOR DEPRESSIVE DISORDER, RECURRENT, UNSPECIFIED (HCC): Status: ACTIVE | Noted: 2024-02-27

## 2024-02-27 PROCEDURE — G8427 DOCREV CUR MEDS BY ELIG CLIN: HCPCS | Performed by: INTERNAL MEDICINE

## 2024-02-27 PROCEDURE — 3074F SYST BP LT 130 MM HG: CPT | Performed by: INTERNAL MEDICINE

## 2024-02-27 PROCEDURE — G8420 CALC BMI NORM PARAMETERS: HCPCS | Performed by: INTERNAL MEDICINE

## 2024-02-27 PROCEDURE — 1036F TOBACCO NON-USER: CPT | Performed by: INTERNAL MEDICINE

## 2024-02-27 PROCEDURE — G8400 PT W/DXA NO RESULTS DOC: HCPCS | Performed by: INTERNAL MEDICINE

## 2024-02-27 PROCEDURE — 1090F PRES/ABSN URINE INCON ASSESS: CPT | Performed by: INTERNAL MEDICINE

## 2024-02-27 PROCEDURE — 3017F COLORECTAL CA SCREEN DOC REV: CPT | Performed by: INTERNAL MEDICINE

## 2024-02-27 PROCEDURE — 1123F ACP DISCUSS/DSCN MKR DOCD: CPT | Performed by: INTERNAL MEDICINE

## 2024-02-27 PROCEDURE — 3078F DIAST BP <80 MM HG: CPT | Performed by: INTERNAL MEDICINE

## 2024-02-27 PROCEDURE — G8484 FLU IMMUNIZE NO ADMIN: HCPCS | Performed by: INTERNAL MEDICINE

## 2024-02-27 PROCEDURE — 99214 OFFICE O/P EST MOD 30 MIN: CPT | Performed by: INTERNAL MEDICINE

## 2024-02-27 NOTE — PROGRESS NOTES
Grisel Palacio is a 73 y.o. female and presents with Headache and Nausea  .    Subjective:  Grisel presents today with headache and nausea.  She has been having recurring headaches on a daily basis.  They are present in the mornings upon awakening and progressed during the course of the day.  She has taken some aspirin for this only.  This is not been very effective for her.  She does have some sinus congestion and minimal drainage.  It seems to be worse on the right side.  She remains on valsartan 160 mg daily for hypertension with good results.  Her cholesterol has been well-controlled on simvastatin.  She continues to take cholestyramine for functional diarrhea which has improved.    Past Medical History:   Diagnosis Date    Arthritis     COVID-19 vaccine series completed 2021    Moderna    Depression     Foot drop, left     GERD (gastroesophageal reflux disease)     High cholesterol     Hyperparathyroidism (HCC) 10/30/2023    Hypertension     Hypertension     Irritable bowel syndrome with diarrhea 10/30/2023    Nausea & vomiting     Psoriasis     Sleep apnea     as of 19 unable to tolerate CPAP     Past Surgical History:   Procedure Laterality Date    BLADDER SUSPENSION       SECTION      CHOLECYSTECTOMY      COLONOSCOPY N/A 10/17/2019    COLONOSCOPY performed by Eligio Serrano MD at [facility] ENDOSCOPY    COLONOSCOPY N/A 2023    COLONOSCOPY DIAGNOSTIC  with polypectomy with biopsy performed by Eligio Serrano MD at [facility] ENDOSCOPY    DILATION AND CURETTAGE OF UTERUS      HEENT Bilateral     retinal reattachment with bands r x2, lx1    TUBAL LIGATION       No Known Allergies  Current Outpatient Medications   Medication Sig Dispense Refill    cholestyramine (QUESTRAN) 4 g packet MIX AND DRINK 1 PACKET BY MOUTH TWICE DAILY 180 each 0    valsartan (DIOVAN) 160 MG tablet Take 1 tablet by mouth daily 90 tablet 3    venlafaxine (EFFEXOR XR) 75 MG extended release capsule Take 1 capsule by

## 2024-02-27 NOTE — PROGRESS NOTES
Grisel Palacio is a 73 y.o. female presenting for Headache and Nausea  .     \"Have you been to the ER, urgent care clinic since your last visit?  Hospitalized since your last visit?\"    NO    “Have you seen or consulted any other health care providers outside of [facility] since your last visit?”    NO

## 2024-02-28 LAB
25(OH)D3 SERPL-MCNC: 31.1 NG/ML (ref 30–100)
ALBUMIN SERPL-MCNC: 3.6 G/DL (ref 3.5–5)
ALBUMIN/GLOB SERPL: 1.2 (ref 1.1–2.2)
ALP SERPL-CCNC: 69 U/L (ref 45–117)
ALT SERPL-CCNC: 23 U/L (ref 12–78)
ANION GAP SERPL CALC-SCNC: 3 MMOL/L (ref 5–15)
AST SERPL-CCNC: 15 U/L (ref 15–37)
BILIRUB SERPL-MCNC: 0.4 MG/DL (ref 0.2–1)
BUN SERPL-MCNC: 14 MG/DL (ref 6–20)
BUN/CREAT SERPL: 22 (ref 12–20)
CALCIUM SERPL-MCNC: 10.9 MG/DL (ref 8.5–10.1)
CALCIUM SERPL-MCNC: 11 MG/DL (ref 8.5–10.1)
CHLORIDE SERPL-SCNC: 106 MMOL/L (ref 97–108)
CHOLEST SERPL-MCNC: 176 MG/DL
CK SERPL-CCNC: 87 U/L (ref 26–192)
CO2 SERPL-SCNC: 32 MMOL/L (ref 21–32)
CREAT SERPL-MCNC: 0.64 MG/DL (ref 0.55–1.02)
ERYTHROCYTE [SEDIMENTATION RATE] IN BLOOD: 4 MM/HR (ref 0–30)
GLOBULIN SER CALC-MCNC: 3.1 G/DL (ref 2–4)
GLUCOSE SERPL-MCNC: 82 MG/DL (ref 65–100)
HDLC SERPL-MCNC: 77 MG/DL
HDLC SERPL: 2.3 (ref 0–5)
LDLC SERPL CALC-MCNC: 83.4 MG/DL (ref 0–100)
POTASSIUM SERPL-SCNC: 4.4 MMOL/L (ref 3.5–5.1)
PROT SERPL-MCNC: 6.7 G/DL (ref 6.4–8.2)
PTH-INTACT SERPL-MCNC: 75.8 PG/ML (ref 18.4–88)
SODIUM SERPL-SCNC: 141 MMOL/L (ref 136–145)
TRIGL SERPL-MCNC: 78 MG/DL
VLDLC SERPL CALC-MCNC: 15.6 MG/DL

## 2024-02-29 NOTE — PROGRESS NOTES
Grisel Palacio is a 73 y.o. female and presents with Cough and Congestion  .    Subjective:    Grisel presents today with complaint of cough and congestion for the past few days.  She has had no fever or chills.  She has no pleuritic chest pain or shortness of breath.  She has taken over-the-counter medication with little or no relief.  Past Medical History:   Diagnosis Date    Arthritis     COVID-19 vaccine series completed 2021    Moderna    Depression     Foot drop, left     GERD (gastroesophageal reflux disease)     High cholesterol     Hyperparathyroidism (HCC) 10/30/2023    Hypertension     Hypertension     Irritable bowel syndrome with diarrhea 10/30/2023    Nausea & vomiting     Psoriasis     Sleep apnea     as of 19 unable to tolerate CPAP     Past Surgical History:   Procedure Laterality Date    BLADDER SUSPENSION       SECTION      CHOLECYSTECTOMY      COLONOSCOPY N/A 10/17/2019    COLONOSCOPY performed by Eligio Serrano MD at [facility] ENDOSCOPY    COLONOSCOPY N/A 2023    COLONOSCOPY DIAGNOSTIC  with polypectomy with biopsy performed by Eligio Serrano MD at [facility] ENDOSCOPY    DILATION AND CURETTAGE OF UTERUS      HEENT Bilateral     retinal reattachment with bands r x2, lx1    TUBAL LIGATION       No Known Allergies  Current Outpatient Medications   Medication Sig Dispense Refill    valsartan (DIOVAN) 160 MG tablet Take 1 tablet by mouth daily 90 tablet 3    venlafaxine (EFFEXOR XR) 75 MG extended release capsule Take 1 capsule by mouth daily 90 capsule 3    simvastatin (ZOCOR) 10 MG tablet Take 1 tablet by mouth nightly      QUEtiapine (SEROQUEL) 25 MG tablet Take 1 tablet by mouth daily      Multiple Vitamins-Minerals (CENTRUM WOMEN) TABS Take by mouth daily      dicyclomine (BENTYL) 20 MG tablet as needed      prednisoLONE acetate (PRED FORTE) 1 % ophthalmic suspension       ketorolac 0.4 % SOLN ophthalmic solution       cholestyramine (QUESTRAN) 4 g packet MIX AND DRINK 1

## 2024-03-04 ENCOUNTER — NURSE ONLY (OUTPATIENT)
Facility: CLINIC | Age: 74
End: 2024-03-04

## 2024-03-04 DIAGNOSIS — E83.42 HYPOMAGNESEMIA: Primary | ICD-10-CM

## 2024-03-05 ENCOUNTER — TELEPHONE (OUTPATIENT)
Facility: CLINIC | Age: 74
End: 2024-03-05

## 2024-03-14 LAB — MAGNESIUM RBC-SCNC: 5 MG/DL (ref 3.7–7)

## 2024-04-28 DIAGNOSIS — R19.7 DIARRHEA, UNSPECIFIED TYPE: ICD-10-CM

## 2024-04-29 RX ORDER — CHOLESTYRAMINE 4 G/9G
POWDER, FOR SUSPENSION ORAL
Qty: 180 EACH | Refills: 0 | Status: SHIPPED | OUTPATIENT
Start: 2024-04-29

## 2024-04-29 NOTE — TELEPHONE ENCOUNTER
PCP: MARIANNE Mckeon MD    Last appt: 2/27/2024    No future appointments.    Requested Prescriptions     Pending Prescriptions Disp Refills    cholestyramine (QUESTRAN) 4 g packet [Pharmacy Med Name: CHOLESTYRAMINE 4GM PACKETS 60S] 180 each 0     Sig: MIX AND DRINK 1 PACKET BY MOUTH TWICE DAILY

## 2024-07-26 ENCOUNTER — OFFICE VISIT (OUTPATIENT)
Facility: CLINIC | Age: 74
End: 2024-07-26

## 2024-07-26 VITALS
DIASTOLIC BLOOD PRESSURE: 74 MMHG | TEMPERATURE: 98.1 F | BODY MASS INDEX: 23.43 KG/M2 | HEART RATE: 72 BPM | WEIGHT: 140.6 LBS | HEIGHT: 65 IN | RESPIRATION RATE: 16 BRPM | SYSTOLIC BLOOD PRESSURE: 138 MMHG | OXYGEN SATURATION: 97 %

## 2024-07-26 DIAGNOSIS — E78.00 HIGH CHOLESTEROL: ICD-10-CM

## 2024-07-26 DIAGNOSIS — Z91.81 AT HIGH RISK FOR FALLS: ICD-10-CM

## 2024-07-26 DIAGNOSIS — K58.0 IRRITABLE BOWEL SYNDROME WITH DIARRHEA: ICD-10-CM

## 2024-07-26 DIAGNOSIS — I10 PRIMARY HYPERTENSION: Primary | ICD-10-CM

## 2024-07-26 DIAGNOSIS — E21.3 HYPERPARATHYROIDISM (HCC): ICD-10-CM

## 2024-07-26 DIAGNOSIS — G44.311 INTRACTABLE ACUTE POST-TRAUMATIC HEADACHE: ICD-10-CM

## 2024-07-26 PROBLEM — F33.0 MAJOR DEPRESSIVE DISORDER, RECURRENT, MILD (HCC): Status: RESOLVED | Noted: 2024-02-27 | Resolved: 2024-07-26

## 2024-07-26 PROBLEM — M46.1 SACROILIITIS, NOT ELSEWHERE CLASSIFIED (HCC): Status: RESOLVED | Noted: 2023-12-20 | Resolved: 2024-07-26

## 2024-07-26 PROBLEM — F33.1 MAJOR DEPRESSIVE DISORDER, RECURRENT, MODERATE (HCC): Status: RESOLVED | Noted: 2024-02-27 | Resolved: 2024-07-26

## 2024-07-26 PROBLEM — F33.9 MAJOR DEPRESSIVE DISORDER, RECURRENT, UNSPECIFIED (HCC): Status: RESOLVED | Noted: 2024-02-27 | Resolved: 2024-07-26

## 2024-07-26 RX ORDER — LANOLIN ALCOHOL/MO/W.PET/CERES
3 CREAM (GRAM) TOPICAL DAILY
COMMUNITY

## 2024-07-26 NOTE — PROGRESS NOTES
Grisel Palacio is a 73 y.o. female     Chief Complaint   Patient presents with    Other     Hit head on the deck table about 3 weeks ago also is experiencing some hot flashes    Headache       /74 (Site: Left Upper Arm, Position: Sitting, Cuff Size: Medium Adult)   Pulse 72   Temp 98.1 °F (36.7 °C) (Oral)   Resp 16   Ht 1.651 m (5' 5\")   Wt 63.8 kg (140 lb 9.6 oz)   SpO2 97%   BMI 23.40 kg/m²     Health Maintenance Due   Topic Date Due    Hepatitis C screen  Never done    DTaP/Tdap/Td vaccine (1 - Tdap) Never done    Shingles vaccine (1 of 2) Never done    DEXA (modify frequency per FRAX score)  Never done    Pneumococcal 65+ years Vaccine (1 of 1 - PCV) Never done    Annual Wellness Visit (Medicare Advantage)  01/01/2024    Depression Monitoring  07/07/2024         \"Have you been to the ER, urgent care clinic since your last visit?  Hospitalized since your last visit?\"    NO    “Have you seen or consulted any other health care providers outside of [facility] since your last visit?”    NO

## 2024-07-26 NOTE — PROGRESS NOTES
Grisel Palacio is a 73 y.o. female and presents with Other (Hit head on the deck table about 3 weeks ago also is experiencing some hot flashes) and Headache  .    Subjective:  Mrs. Palacio presents today after hitting her head on a table twice approximately 3 weeks ago.  Since that time she has continued to have a right frontal headache that has been persistent and unrelenting.  She has taken over-the-counter medication for this but this has not helped.  The headache is present continuously.  She has no associated symptoms such as change in vision, dizziness, nausea, photophobia.  She did mention having some hot flashes when out in the yard but thinks this may have been related to being outside.  She has a history of left foot drop and notes that she has fallen multiple times over the past year.  She states this is not new for her.    Past Medical History:   Diagnosis Date    Arthritis     COVID-19 vaccine series completed 2021    Moderna    Depression     Foot drop, left     GERD (gastroesophageal reflux disease)     High cholesterol     Hyperparathyroidism (HCC) 10/30/2023    Hypertension     Hypertension     Irritable bowel syndrome with diarrhea 10/30/2023    Nausea & vomiting     Psoriasis     Sleep apnea     as of 19 unable to tolerate CPAP     Past Surgical History:   Procedure Laterality Date    BLADDER SUSPENSION       SECTION      CHOLECYSTECTOMY      COLONOSCOPY N/A 10/17/2019    COLONOSCOPY performed by Eligio Serrano MD at [facility] ENDOSCOPY    COLONOSCOPY N/A 2023    COLONOSCOPY DIAGNOSTIC  with polypectomy with biopsy performed by Eligio Serrano MD at [facility] ENDOSCOPY    DILATION AND CURETTAGE OF UTERUS      HEENT Bilateral     retinal reattachment with bands r x2, lx1    TUBAL LIGATION       No Known Allergies  Current Outpatient Medications   Medication Sig Dispense Refill    melatonin 3 MG TABS tablet Take 1 tablet by mouth daily      cholestyramine (QUESTRAN) 4 g packet MIX

## 2024-07-31 DIAGNOSIS — R19.7 DIARRHEA, UNSPECIFIED TYPE: ICD-10-CM

## 2024-07-31 RX ORDER — CHOLESTYRAMINE 4 G/9G
POWDER, FOR SUSPENSION ORAL
Qty: 180 EACH | Refills: 0 | Status: SHIPPED | OUTPATIENT
Start: 2024-07-31

## 2024-07-31 NOTE — TELEPHONE ENCOUNTER
PCP: MARIANNE Mckeon MD    Last appt: 7/26/2024    Future Appointments   Date Time Provider Department Center   8/2/2024  1:00 PM [facility] CT 1 MRMRCT [facility]   1/27/2025 11:00 AM MARIANNE Mckeon MD [facility] AMB       Requested Prescriptions     Pending Prescriptions Disp Refills    cholestyramine (QUESTRAN) 4 g packet [Pharmacy Med Name: CHOLESTYRAMINE 4GM PACKETS 60S] 180 each 0     Sig: MIX AND DRINK 1 PACKET BY MOUTH TWICE DAILY

## 2024-08-02 ENCOUNTER — HOSPITAL ENCOUNTER (OUTPATIENT)
Facility: HOSPITAL | Age: 74
End: 2024-08-02
Attending: INTERNAL MEDICINE
Payer: MEDICARE

## 2024-08-02 DIAGNOSIS — G44.311 INTRACTABLE ACUTE POST-TRAUMATIC HEADACHE: ICD-10-CM

## 2024-08-02 PROCEDURE — 70450 CT HEAD/BRAIN W/O DYE: CPT

## 2024-10-28 ENCOUNTER — OFFICE VISIT (OUTPATIENT)
Facility: CLINIC | Age: 74
End: 2024-10-28
Payer: MEDICARE

## 2024-10-28 VITALS
BODY MASS INDEX: 23.39 KG/M2 | HEIGHT: 65 IN | DIASTOLIC BLOOD PRESSURE: 79 MMHG | OXYGEN SATURATION: 95 % | HEART RATE: 85 BPM | WEIGHT: 140.4 LBS | SYSTOLIC BLOOD PRESSURE: 138 MMHG | RESPIRATION RATE: 18 BRPM | TEMPERATURE: 98.5 F

## 2024-10-28 DIAGNOSIS — F32.0 CURRENT MILD EPISODE OF MAJOR DEPRESSIVE DISORDER, UNSPECIFIED WHETHER RECURRENT (HCC): ICD-10-CM

## 2024-10-28 DIAGNOSIS — I10 PRIMARY HYPERTENSION: Primary | ICD-10-CM

## 2024-10-28 DIAGNOSIS — R19.7 DIARRHEA, UNSPECIFIED TYPE: ICD-10-CM

## 2024-10-28 DIAGNOSIS — E21.3 HYPERPARATHYROIDISM (HCC): ICD-10-CM

## 2024-10-28 PROCEDURE — G8420 CALC BMI NORM PARAMETERS: HCPCS | Performed by: INTERNAL MEDICINE

## 2024-10-28 PROCEDURE — 1090F PRES/ABSN URINE INCON ASSESS: CPT | Performed by: INTERNAL MEDICINE

## 2024-10-28 PROCEDURE — 3078F DIAST BP <80 MM HG: CPT | Performed by: INTERNAL MEDICINE

## 2024-10-28 PROCEDURE — G8427 DOCREV CUR MEDS BY ELIG CLIN: HCPCS | Performed by: INTERNAL MEDICINE

## 2024-10-28 PROCEDURE — 1123F ACP DISCUSS/DSCN MKR DOCD: CPT | Performed by: INTERNAL MEDICINE

## 2024-10-28 PROCEDURE — 1036F TOBACCO NON-USER: CPT | Performed by: INTERNAL MEDICINE

## 2024-10-28 PROCEDURE — G8484 FLU IMMUNIZE NO ADMIN: HCPCS | Performed by: INTERNAL MEDICINE

## 2024-10-28 PROCEDURE — G8400 PT W/DXA NO RESULTS DOC: HCPCS | Performed by: INTERNAL MEDICINE

## 2024-10-28 PROCEDURE — 3075F SYST BP GE 130 - 139MM HG: CPT | Performed by: INTERNAL MEDICINE

## 2024-10-28 PROCEDURE — 99213 OFFICE O/P EST LOW 20 MIN: CPT | Performed by: INTERNAL MEDICINE

## 2024-10-28 RX ORDER — VENLAFAXINE HYDROCHLORIDE 150 MG/1
150 CAPSULE, EXTENDED RELEASE ORAL DAILY
Qty: 90 CAPSULE | Refills: 1 | Status: SHIPPED | OUTPATIENT
Start: 2024-10-28

## 2024-10-28 RX ORDER — VENLAFAXINE HYDROCHLORIDE 75 MG/1
75 CAPSULE, EXTENDED RELEASE ORAL DAILY
Qty: 90 CAPSULE | Refills: 3 | OUTPATIENT
Start: 2024-10-28

## 2024-10-28 NOTE — PROGRESS NOTES
Grisel Palacio is a 74 y.o. female     Chief Complaint   Patient presents with    issue sleeping,depression,falling alot        /79 (Site: Left Upper Arm, Position: Sitting, Cuff Size: Medium Adult)   Pulse 85   Temp 98.5 °F (36.9 °C) (Temporal)   Resp 18   Ht 1.651 m (5' 5\")   Wt 63.7 kg (140 lb 6.4 oz)   SpO2 95%   BMI 23.36 kg/m²     Health Maintenance Due   Topic Date Due    Hepatitis C screen  Never done    DTaP/Tdap/Td vaccine (1 - Tdap) Never done    Shingles vaccine (1 of 2) Never done    DEXA (modify frequency per FRAX score)  Never done    Pneumococcal 65+ years Vaccine (1 of 1 - PCV) Never done    Annual Wellness Visit (Medicare Advantage)  01/01/2024    Flu vaccine (1) 08/01/2024    COVID-19 Vaccine (5 - 2023-24 season) 09/01/2024         \"Have you been to the ER, urgent care clinic since your last visit?  Hospitalized since your last visit?\"    NO    “Have you seen or consulted any other health care providers outside of [facility] since your last visit?”    NO                   
DAILY 180 each 0    melatonin 3 MG TABS tablet Take 1 tablet by mouth daily      valsartan (DIOVAN) 160 MG tablet Take 1 tablet by mouth daily 90 tablet 3    simvastatin (ZOCOR) 10 MG tablet Take 1 tablet by mouth nightly      Multiple Vitamins-Minerals (CENTRUM WOMEN) TABS Take by mouth daily      ketorolac 0.4 % SOLN ophthalmic solution        No current facility-administered medications for this visit.     Social History     Socioeconomic History    Marital status:    Tobacco Use    Smoking status: Never    Smokeless tobacco: Never   Vaping Use    Vaping status: Never Used   Substance and Sexual Activity    Alcohol use: No    Drug use: No    Sexual activity: Not Currently     Partners: Male     Social Determinants of Health     Financial Resource Strain: Low Risk  (7/26/2024)    Overall Financial Resource Strain (CARDIA)     Difficulty of Paying Living Expenses: Not hard at all   Food Insecurity: No Food Insecurity (7/26/2024)    Hunger Vital Sign     Worried About Running Out of Food in the Last Year: Never true     Ran Out of Food in the Last Year: Never true   Transportation Needs: Unknown (7/26/2024)    PRAPARE - Transportation     Lack of Transportation (Non-Medical): No   Physical Activity: Inactive (7/7/2023)    Exercise Vital Sign     Days of Exercise per Week: 0 days     Minutes of Exercise per Session: 0 min   Housing Stability: Unknown (7/26/2024)    Housing Stability Vital Sign     Unstable Housing in the Last Year: No     Family History   Problem Relation Age of Onset    Cancer Maternal Aunt         breast     Breast Cancer Maternal Aunt     Other Mother         scleraderma    Heart Disease Father        Review of Systems  Constitutional:  negative for fevers, chills, anorexia and weight loss  Eyes:    negative for visual disturbance and irritation  ENT:    negative for tinnitus,sore throat,nasal congestion,ear pains.hoarseness  Respiratory:     negative for cough, hemoptysis,

## 2024-10-31 RX ORDER — VALSARTAN 160 MG/1
160 TABLET ORAL DAILY
Qty: 90 TABLET | Refills: 3 | Status: SHIPPED | OUTPATIENT
Start: 2024-10-31

## 2024-10-31 RX ORDER — CHOLESTYRAMINE 4 G/9G
POWDER, FOR SUSPENSION ORAL
Qty: 180 EACH | Refills: 0 | Status: SHIPPED | OUTPATIENT
Start: 2024-10-31

## 2024-10-31 NOTE — TELEPHONE ENCOUNTER
PCP: MARIANNE Mckeon MD    Last appt: 7/26/2024    Future Appointments   Date Time Provider Department Center   1/27/2025 11:00 AM MARIANNE Mckeon MD [facility] DEP       Requested Prescriptions     Pending Prescriptions Disp Refills    cholestyramine (QUESTRAN) 4 g packet [Pharmacy Med Name: CHOLESTYRAMINE 4GM PACKETS 60S] 180 each 0     Sig: MIX AND DRINK 1 PACKET BY MOUTH TWICE DAILY    valsartan (DIOVAN) 160 MG tablet [Pharmacy Med Name: VALSARTAN 160MG TABLETS] 90 tablet 3     Sig: TAKE 1 TABLET BY MOUTH DAILY     Refused Prescriptions Disp Refills    venlafaxine (EFFEXOR XR) 75 MG extended release capsule [Pharmacy Med Name: VENLAFAXINE ER 75MG CAPSULES] 90 capsule 3     Sig: Take 1 capsule by mouth daily     Refused By: MARIANNE MCKEON     Reason for Refusal: Medication dose changed

## 2024-11-22 NOTE — TELEPHONE ENCOUNTER
Patient is requesting a refill on valsartan (DIOVAN) 160 MG tablet, venlafaxine (EFFEXOR XR) 150 MG extended release capsule Now taking two capsules daily but would like 300 mg and simvastatin (ZOCOR) 10 MG tablet. Please send to maria t (on file)

## 2024-11-22 NOTE — TELEPHONE ENCOUNTER
PCP: MARIANNE Mckeon MD    Last appt: 10/28/2024    Future Appointments   Date Time Provider Department Center   1/27/2025 11:00 AM MARIANNE Mckeon MD [facility] DEP       Requested Prescriptions     Pending Prescriptions Disp Refills    valsartan (DIOVAN) 160 MG tablet 90 tablet 3     Sig: Take 1 tablet by mouth daily    venlafaxine (EFFEXOR XR) 150 MG extended release capsule 180 capsule 1     Sig: Take 1 capsule by mouth 2 times daily    simvastatin (ZOCOR) 10 MG tablet 90 tablet 1     Sig: Take 1 tablet by mouth nightly

## 2024-11-26 RX ORDER — VENLAFAXINE HYDROCHLORIDE 150 MG/1
150 CAPSULE, EXTENDED RELEASE ORAL 2 TIMES DAILY
Qty: 180 CAPSULE | Refills: 1 | Status: SHIPPED | OUTPATIENT
Start: 2024-11-26

## 2024-11-26 RX ORDER — SIMVASTATIN 10 MG
10 TABLET ORAL NIGHTLY
Qty: 90 TABLET | Refills: 1 | Status: SHIPPED | OUTPATIENT
Start: 2024-11-26

## 2024-11-26 RX ORDER — VALSARTAN 160 MG/1
160 TABLET ORAL DAILY
Qty: 90 TABLET | Refills: 3 | Status: SHIPPED | OUTPATIENT
Start: 2024-11-26

## 2025-01-23 ENCOUNTER — OFFICE VISIT (OUTPATIENT)
Facility: CLINIC | Age: 75
End: 2025-01-23
Payer: MEDICARE

## 2025-01-23 VITALS
RESPIRATION RATE: 18 BRPM | BODY MASS INDEX: 24.06 KG/M2 | WEIGHT: 144.4 LBS | HEIGHT: 65 IN | OXYGEN SATURATION: 97 % | TEMPERATURE: 98.1 F | HEART RATE: 83 BPM | DIASTOLIC BLOOD PRESSURE: 79 MMHG | SYSTOLIC BLOOD PRESSURE: 119 MMHG

## 2025-01-23 DIAGNOSIS — E78.00 HIGH CHOLESTEROL: ICD-10-CM

## 2025-01-23 DIAGNOSIS — R53.83 FATIGUE, UNSPECIFIED TYPE: ICD-10-CM

## 2025-01-23 DIAGNOSIS — E21.3 HYPERPARATHYROIDISM: ICD-10-CM

## 2025-01-23 DIAGNOSIS — M96.1 LUMBAR POST-LAMINECTOMY SYNDROME: ICD-10-CM

## 2025-01-23 DIAGNOSIS — I10 PRIMARY HYPERTENSION: Primary | ICD-10-CM

## 2025-01-23 DIAGNOSIS — K58.0 IRRITABLE BOWEL SYNDROME WITH DIARRHEA: ICD-10-CM

## 2025-01-23 PROCEDURE — 3074F SYST BP LT 130 MM HG: CPT | Performed by: INTERNAL MEDICINE

## 2025-01-23 PROCEDURE — 1036F TOBACCO NON-USER: CPT | Performed by: INTERNAL MEDICINE

## 2025-01-23 PROCEDURE — G8400 PT W/DXA NO RESULTS DOC: HCPCS | Performed by: INTERNAL MEDICINE

## 2025-01-23 PROCEDURE — G8420 CALC BMI NORM PARAMETERS: HCPCS | Performed by: INTERNAL MEDICINE

## 2025-01-23 PROCEDURE — 99214 OFFICE O/P EST MOD 30 MIN: CPT | Performed by: INTERNAL MEDICINE

## 2025-01-23 PROCEDURE — G8427 DOCREV CUR MEDS BY ELIG CLIN: HCPCS | Performed by: INTERNAL MEDICINE

## 2025-01-23 PROCEDURE — 1090F PRES/ABSN URINE INCON ASSESS: CPT | Performed by: INTERNAL MEDICINE

## 2025-01-23 PROCEDURE — 3078F DIAST BP <80 MM HG: CPT | Performed by: INTERNAL MEDICINE

## 2025-01-23 PROCEDURE — 1123F ACP DISCUSS/DSCN MKR DOCD: CPT | Performed by: INTERNAL MEDICINE

## 2025-01-23 PROCEDURE — 1159F MED LIST DOCD IN RCRD: CPT | Performed by: INTERNAL MEDICINE

## 2025-01-23 SDOH — ECONOMIC STABILITY: FOOD INSECURITY: WITHIN THE PAST 12 MONTHS, THE FOOD YOU BOUGHT JUST DIDN'T LAST AND YOU DIDN'T HAVE MONEY TO GET MORE.: NEVER TRUE

## 2025-01-23 SDOH — ECONOMIC STABILITY: FOOD INSECURITY: WITHIN THE PAST 12 MONTHS, YOU WORRIED THAT YOUR FOOD WOULD RUN OUT BEFORE YOU GOT MONEY TO BUY MORE.: NEVER TRUE

## 2025-01-23 ASSESSMENT — PATIENT HEALTH QUESTIONNAIRE - PHQ9
4. FEELING TIRED OR HAVING LITTLE ENERGY: NOT AT ALL
7. TROUBLE CONCENTRATING ON THINGS, SUCH AS READING THE NEWSPAPER OR WATCHING TELEVISION: NOT AT ALL
9. THOUGHTS THAT YOU WOULD BE BETTER OFF DEAD, OR OF HURTING YOURSELF: NOT AT ALL
SUM OF ALL RESPONSES TO PHQ QUESTIONS 1-9: 0
SUM OF ALL RESPONSES TO PHQ QUESTIONS 1-9: 0
2. FEELING DOWN, DEPRESSED OR HOPELESS: NOT AT ALL
SUM OF ALL RESPONSES TO PHQ QUESTIONS 1-9: 0
1. LITTLE INTEREST OR PLEASURE IN DOING THINGS: NOT AT ALL
6. FEELING BAD ABOUT YOURSELF - OR THAT YOU ARE A FAILURE OR HAVE LET YOURSELF OR YOUR FAMILY DOWN: NOT AT ALL
10. IF YOU CHECKED OFF ANY PROBLEMS, HOW DIFFICULT HAVE THESE PROBLEMS MADE IT FOR YOU TO DO YOUR WORK, TAKE CARE OF THINGS AT HOME, OR GET ALONG WITH OTHER PEOPLE: NOT DIFFICULT AT ALL
5. POOR APPETITE OR OVEREATING: NOT AT ALL
SUM OF ALL RESPONSES TO PHQ9 QUESTIONS 1 & 2: 0
SUM OF ALL RESPONSES TO PHQ QUESTIONS 1-9: 0
8. MOVING OR SPEAKING SO SLOWLY THAT OTHER PEOPLE COULD HAVE NOTICED. OR THE OPPOSITE, BEING SO FIGETY OR RESTLESS THAT YOU HAVE BEEN MOVING AROUND A LOT MORE THAN USUAL: NOT AT ALL
3. TROUBLE FALLING OR STAYING ASLEEP: NOT AT ALL

## 2025-01-23 NOTE — PROGRESS NOTES
Grisel Palacio is a 74 y.o. female     Chief Complaint   Patient presents with    6 Month Follow-Up       /79 (Site: Left Upper Arm, Position: Sitting, Cuff Size: Medium Adult)   Pulse 83   Temp 98.1 °F (36.7 °C) (Temporal)   Resp 18   Ht 1.651 m (5' 5\")   Wt 65.5 kg (144 lb 6.4 oz)   SpO2 97%   BMI 24.03 kg/m²     Health Maintenance Due   Topic Date Due    Hepatitis C screen  Never done    DTaP/Tdap/Td vaccine (1 - Tdap) Never done    Shingles vaccine (1 of 2) Never done    DEXA (modify frequency per FRAX score)  Never done    Pneumococcal 65+ years Vaccine (1 of 1 - PCV) Never done    Flu vaccine (1) 08/01/2024    COVID-19 Vaccine (5 - 2023-24 season) 09/01/2024    Annual Wellness Visit (Medicare Advantage)  01/01/2025         \"Have you been to the ER, urgent care clinic since your last visit?  Hospitalized since your last visit?\"    NO    “Have you seen or consulted any other health care providers outside of [facility] since your last visit?”    NO

## 2025-01-29 DIAGNOSIS — F51.01 PRIMARY INSOMNIA: Primary | ICD-10-CM

## 2025-01-29 RX ORDER — TRAZODONE HYDROCHLORIDE 50 MG/1
50 TABLET, FILM COATED ORAL NIGHTLY
Qty: 90 TABLET | Refills: 1 | Status: SHIPPED | OUTPATIENT
Start: 2025-01-29

## 2025-02-03 DIAGNOSIS — R19.7 DIARRHEA, UNSPECIFIED TYPE: ICD-10-CM

## 2025-02-03 NOTE — TELEPHONE ENCOUNTER
PCP: MARIANNE Mckeon MD    Last appt: 1/23/2025    Future Appointments   Date Time Provider Department Center   2/12/2025 10:30 AM MARIANNE Mckeon MD [facility] DEP       Requested Prescriptions     Pending Prescriptions Disp Refills    cholestyramine (QUESTRAN) 4 g packet [Pharmacy Med Name: CHOLESTYRAMINE 4GM PACKETS 60S] 180 each 0     Sig: MIX AND DRINK 1 PACKET BY MOUTH TWICE DAILY

## 2025-02-04 RX ORDER — CHOLESTYRAMINE 4 G/9G
POWDER, FOR SUSPENSION ORAL
Qty: 180 EACH | Refills: 0 | Status: SHIPPED | OUTPATIENT
Start: 2025-02-04

## 2025-02-26 NOTE — PROGRESS NOTES
Grisel Palacio is a 74 y.o. female and presents with 6 Month Follow-Up  .    Subjective:  Mrs. Palacio presents today for 6-month follow-up for several problems including hypertension, irritable bowel syndrome, hyperparathyroidism, lumbar laminectomy syndrome, and depression.  She is doing well on her current medical regimen.  Her blood pressure has been controlled on valsartan 160 mg daily.  She has no shortness of breath, chest pain, palpitations, PND, orthopnea, or pedal edema.  She remains on simvastatin for hyperlipidemia with her last LDL cholesterol at 83.  She continues to take cholestyramine for irritable bowel syndrome with diarrhea.  This has worked well for her.    Past Medical History:   Diagnosis Date    Arthritis     COVID-19 vaccine series completed 2021    Moderna    Depression     Foot drop, left     GERD (gastroesophageal reflux disease)     High cholesterol     Hyperparathyroidism 10/30/2023    Hypertension     Hypertension     Irritable bowel syndrome with diarrhea 10/30/2023    Nausea & vomiting     Psoriasis     Sleep apnea     as of 19 unable to tolerate CPAP     Past Surgical History:   Procedure Laterality Date    BLADDER SUSPENSION       SECTION      CHOLECYSTECTOMY      COLONOSCOPY N/A 10/17/2019    COLONOSCOPY performed by Eligio Serrano MD at [facility] ENDOSCOPY    COLONOSCOPY N/A 2023    COLONOSCOPY DIAGNOSTIC  with polypectomy with biopsy performed by Eligio Serrano MD at [facility] ENDOSCOPY    DILATION AND CURETTAGE OF UTERUS      HEENT Bilateral     retinal reattachment with bands r x2, lx1    TUBAL LIGATION       No Known Allergies  Current Outpatient Medications   Medication Sig Dispense Refill    valsartan (DIOVAN) 160 MG tablet Take 1 tablet by mouth daily 90 tablet 3    venlafaxine (EFFEXOR XR) 150 MG extended release capsule Take 1 capsule by mouth 2 times daily 180 capsule 1    simvastatin (ZOCOR) 10 MG tablet Take 1 tablet by mouth nightly 90 tablet 1

## 2025-04-28 ENCOUNTER — OFFICE VISIT (OUTPATIENT)
Facility: CLINIC | Age: 75
End: 2025-04-28
Payer: MEDICARE

## 2025-04-28 VITALS
BODY MASS INDEX: 24.16 KG/M2 | WEIGHT: 145 LBS | DIASTOLIC BLOOD PRESSURE: 79 MMHG | TEMPERATURE: 98.8 F | SYSTOLIC BLOOD PRESSURE: 140 MMHG | RESPIRATION RATE: 18 BRPM | OXYGEN SATURATION: 97 % | HEIGHT: 65 IN | HEART RATE: 85 BPM

## 2025-04-28 DIAGNOSIS — E21.3 HYPERPARATHYROIDISM: Primary | ICD-10-CM

## 2025-04-28 PROCEDURE — G8420 CALC BMI NORM PARAMETERS: HCPCS | Performed by: INTERNAL MEDICINE

## 2025-04-28 PROCEDURE — 1159F MED LIST DOCD IN RCRD: CPT | Performed by: INTERNAL MEDICINE

## 2025-04-28 PROCEDURE — 1123F ACP DISCUSS/DSCN MKR DOCD: CPT | Performed by: INTERNAL MEDICINE

## 2025-04-28 PROCEDURE — 1036F TOBACCO NON-USER: CPT | Performed by: INTERNAL MEDICINE

## 2025-04-28 PROCEDURE — G8400 PT W/DXA NO RESULTS DOC: HCPCS | Performed by: INTERNAL MEDICINE

## 2025-04-28 PROCEDURE — 3078F DIAST BP <80 MM HG: CPT | Performed by: INTERNAL MEDICINE

## 2025-04-28 PROCEDURE — 3077F SYST BP >= 140 MM HG: CPT | Performed by: INTERNAL MEDICINE

## 2025-04-28 PROCEDURE — G8427 DOCREV CUR MEDS BY ELIG CLIN: HCPCS | Performed by: INTERNAL MEDICINE

## 2025-04-28 PROCEDURE — 99213 OFFICE O/P EST LOW 20 MIN: CPT | Performed by: INTERNAL MEDICINE

## 2025-04-28 PROCEDURE — 1090F PRES/ABSN URINE INCON ASSESS: CPT | Performed by: INTERNAL MEDICINE

## 2025-04-28 NOTE — PROGRESS NOTES
Grisel Palacio is a 74 y.o. female     Chief Complaint   Patient presents with    Questions on lab work [facility]       BP (!) 140/79 (BP Site: Left Upper Arm, Patient Position: Sitting, BP Cuff Size: Medium Adult)   Pulse 85   Temp 98.8 °F (37.1 °C) (Temporal)   Resp 18   Ht 1.651 m (5' 5\")   Wt 65.8 kg (145 lb)   SpO2 97%   BMI 24.13 kg/m²     Health Maintenance Due   Topic Date Due    Hepatitis C screen  Never done    DTaP/Tdap/Td vaccine (1 - Tdap) Never done    Shingles vaccine (1 of 2) Never done    Pneumococcal 50+ years Vaccine (1 of 1 - PCV) Never done    DEXA (modify frequency per FRAX score)  Never done    COVID-19 Vaccine (5 - 2024-25 season) 09/01/2024    Annual Wellness Visit (Medicare Advantage)  01/01/2025    Lipids  02/27/2025         \"Have you been to the ER, urgent care clinic since your last visit?  Hospitalized since your last visit?\"    NO    “Have you seen or consulted any other health care providers outside of [facility] since your last visit?”    NO

## 2025-04-28 NOTE — PROGRESS NOTES
Grisel Palacio is a 74 y.o. female and presents with Questions on lab work [facility]  .    Subjective:  Grisel presents today with questions regarding lab work done recently at [facility].  She has a history of mildly elevated calcium and hyperparathyroidism indicated by elevated PTH however her last PTH was normal and her calcium level has been stable.  She does have generalized symptoms of fatigability, decreased bone density, thinning of hair, difficulty sleeping, difficulty thinking.  She thinks she may have had a history of a kidney stone when she was very young but has not had any recurring kidney stones.  She had a recent bone density test but I do not have the results of this testing currently.  She had a 24-hour urine collection which she states was collected for longer than 24 hours and showed a urine calcium of 454 which is just outside of normal range.  Her calcium was 10.5 her PTH was normal at 65.  Her TSH was normal.  Past Medical History:   Diagnosis Date    Arthritis     COVID-19 vaccine series completed 2021    Moderna    Depression     Foot drop, left     GERD (gastroesophageal reflux disease)     High cholesterol     Hyperparathyroidism 10/30/2023    Hypertension     Hypertension     Irritable bowel syndrome with diarrhea 10/30/2023    Nausea & vomiting     Psoriasis     Sleep apnea     as of 19 unable to tolerate CPAP     Past Surgical History:   Procedure Laterality Date    BLADDER SUSPENSION       SECTION      CHOLECYSTECTOMY      COLONOSCOPY N/A 10/17/2019    COLONOSCOPY performed by Eligio Serrano MD at [facility] ENDOSCOPY    COLONOSCOPY N/A 2023    COLONOSCOPY DIAGNOSTIC  with polypectomy with biopsy performed by Eligio Serrano MD at [facility] ENDOSCOPY    DILATION AND CURETTAGE OF UTERUS      HEENT Bilateral     retinal reattachment with bands r x2, lx1    TUBAL LIGATION       No Known Allergies  Current Outpatient Medications   Medication Sig

## 2025-05-11 DIAGNOSIS — R19.7 DIARRHEA, UNSPECIFIED TYPE: ICD-10-CM

## 2025-05-12 NOTE — TELEPHONE ENCOUNTER
PCP: MARIANNE Mckeon MD    Last appt: 4/28/2025    No future appointments.    Requested Prescriptions     Pending Prescriptions Disp Refills    cholestyramine (QUESTRAN) 4 g packet [Pharmacy Med Name: CHOLESTYRAMINE 4GM PACKETS 60S] 180 each 0     Sig: MIX AND DRINK 1 PACKET BY MOUTH TWICE DAILY

## 2025-05-13 RX ORDER — CHOLESTYRAMINE 4 G/9G
POWDER, FOR SUSPENSION ORAL
Qty: 180 EACH | Refills: 0 | Status: SHIPPED | OUTPATIENT
Start: 2025-05-13

## 2025-05-30 RX ORDER — SIMVASTATIN 10 MG
10 TABLET ORAL NIGHTLY
Qty: 90 TABLET | Refills: 1 | Status: SHIPPED | OUTPATIENT
Start: 2025-05-30

## 2025-05-30 RX ORDER — VENLAFAXINE HYDROCHLORIDE 150 MG/1
150 CAPSULE, EXTENDED RELEASE ORAL 2 TIMES DAILY
Qty: 180 CAPSULE | Refills: 1 | Status: SHIPPED | OUTPATIENT
Start: 2025-05-30

## 2025-05-30 NOTE — TELEPHONE ENCOUNTER
PCP: MARIANNE Mckeon MD    Last appt: 4/28/2025    No future appointments.    Requested Prescriptions     Pending Prescriptions Disp Refills    simvastatin (ZOCOR) 10 MG tablet [Pharmacy Med Name: SIMVASTATIN 10MG TABLETS] 90 tablet 1     Sig: TAKE 1 TABLET BY MOUTH EVERY NIGHT    venlafaxine (EFFEXOR XR) 150 MG extended release capsule [Pharmacy Med Name: VENLAFAXINE ER 150MG CAPSULES] 180 capsule 1     Sig: TAKE 1 CAPSULE BY MOUTH TWICE DAILY

## 2025-06-18 ENCOUNTER — OFFICE VISIT (OUTPATIENT)
Facility: CLINIC | Age: 75
End: 2025-06-18
Payer: MEDICARE

## 2025-06-18 VITALS
WEIGHT: 148.8 LBS | OXYGEN SATURATION: 98 % | HEIGHT: 65 IN | RESPIRATION RATE: 18 BRPM | BODY MASS INDEX: 24.79 KG/M2 | SYSTOLIC BLOOD PRESSURE: 124 MMHG | DIASTOLIC BLOOD PRESSURE: 78 MMHG | TEMPERATURE: 98.6 F | HEART RATE: 82 BPM

## 2025-06-18 DIAGNOSIS — F32.0 CURRENT MILD EPISODE OF MAJOR DEPRESSIVE DISORDER, UNSPECIFIED WHETHER RECURRENT: ICD-10-CM

## 2025-06-18 DIAGNOSIS — K58.0 IRRITABLE BOWEL SYNDROME WITH DIARRHEA: ICD-10-CM

## 2025-06-18 DIAGNOSIS — I10 PRIMARY HYPERTENSION: ICD-10-CM

## 2025-06-18 DIAGNOSIS — E21.3 HYPERPARATHYROIDISM: Primary | ICD-10-CM

## 2025-06-18 DIAGNOSIS — M96.1 LUMBAR POST-LAMINECTOMY SYNDROME: ICD-10-CM

## 2025-06-18 DIAGNOSIS — R53.83 FATIGUE, UNSPECIFIED TYPE: ICD-10-CM

## 2025-06-18 PROCEDURE — 3074F SYST BP LT 130 MM HG: CPT | Performed by: INTERNAL MEDICINE

## 2025-06-18 PROCEDURE — 1123F ACP DISCUSS/DSCN MKR DOCD: CPT | Performed by: INTERNAL MEDICINE

## 2025-06-18 PROCEDURE — 99214 OFFICE O/P EST MOD 30 MIN: CPT | Performed by: INTERNAL MEDICINE

## 2025-06-18 PROCEDURE — 1036F TOBACCO NON-USER: CPT | Performed by: INTERNAL MEDICINE

## 2025-06-18 PROCEDURE — 1159F MED LIST DOCD IN RCRD: CPT | Performed by: INTERNAL MEDICINE

## 2025-06-18 PROCEDURE — G8427 DOCREV CUR MEDS BY ELIG CLIN: HCPCS | Performed by: INTERNAL MEDICINE

## 2025-06-18 PROCEDURE — G8420 CALC BMI NORM PARAMETERS: HCPCS | Performed by: INTERNAL MEDICINE

## 2025-06-18 PROCEDURE — G8400 PT W/DXA NO RESULTS DOC: HCPCS | Performed by: INTERNAL MEDICINE

## 2025-06-18 PROCEDURE — 3078F DIAST BP <80 MM HG: CPT | Performed by: INTERNAL MEDICINE

## 2025-06-18 PROCEDURE — 1090F PRES/ABSN URINE INCON ASSESS: CPT | Performed by: INTERNAL MEDICINE

## 2025-06-19 LAB
ALBUMIN SERPL-MCNC: 3.9 G/DL (ref 3.5–5)
ALBUMIN/GLOB SERPL: 1.1 (ref 1.1–2.2)
ALP SERPL-CCNC: 69 U/L (ref 45–117)
ALT SERPL-CCNC: 26 U/L (ref 12–78)
ANION GAP SERPL CALC-SCNC: 1 MMOL/L (ref 2–12)
APPEARANCE UR: ABNORMAL
AST SERPL-CCNC: 29 U/L (ref 15–37)
BACTERIA URNS QL MICRO: ABNORMAL /HPF
BASOPHILS # BLD: 0.07 K/UL (ref 0–0.1)
BASOPHILS NFR BLD: 0.7 % (ref 0–1)
BILIRUB SERPL-MCNC: 0.4 MG/DL (ref 0.2–1)
BILIRUB UR QL: NEGATIVE
BUN SERPL-MCNC: 11 MG/DL (ref 6–20)
BUN/CREAT SERPL: 14 (ref 12–20)
CALCIUM SERPL-MCNC: 10.7 MG/DL (ref 8.5–10.1)
CAOX CRY URNS QL MICRO: ABNORMAL
CHLORIDE SERPL-SCNC: 105 MMOL/L (ref 97–108)
CO2 SERPL-SCNC: 31 MMOL/L (ref 21–32)
COLOR UR: ABNORMAL
CREAT SERPL-MCNC: 0.8 MG/DL (ref 0.55–1.02)
DIFFERENTIAL METHOD BLD: NORMAL
EOSINOPHIL # BLD: 0.36 K/UL (ref 0–0.4)
EOSINOPHIL NFR BLD: 3.7 % (ref 0–7)
EPITH CASTS URNS QL MICRO: ABNORMAL /LPF
ERYTHROCYTE [DISTWIDTH] IN BLOOD BY AUTOMATED COUNT: 12.5 % (ref 11.5–14.5)
GLOBULIN SER CALC-MCNC: 3.4 G/DL (ref 2–4)
GLUCOSE SERPL-MCNC: 85 MG/DL (ref 65–100)
GLUCOSE UR STRIP.AUTO-MCNC: NEGATIVE MG/DL
HCT VFR BLD AUTO: 43.9 % (ref 35–47)
HGB BLD-MCNC: 13.9 G/DL (ref 11.5–16)
HGB UR QL STRIP: ABNORMAL
IMM GRANULOCYTES # BLD AUTO: 0.04 K/UL (ref 0–0.04)
IMM GRANULOCYTES NFR BLD AUTO: 0.4 % (ref 0–0.5)
KETONES UR QL STRIP.AUTO: NEGATIVE MG/DL
LEUKOCYTE ESTERASE UR QL STRIP.AUTO: ABNORMAL
LYMPHOCYTES # BLD: 2.9 K/UL (ref 0.8–3.5)
LYMPHOCYTES NFR BLD: 30.2 % (ref 12–49)
MCH RBC QN AUTO: 30.5 PG (ref 26–34)
MCHC RBC AUTO-ENTMCNC: 31.7 G/DL (ref 30–36.5)
MCV RBC AUTO: 96.3 FL (ref 80–99)
MONOCYTES # BLD: 0.96 K/UL (ref 0–1)
MONOCYTES NFR BLD: 10 % (ref 5–13)
NEUTS SEG # BLD: 5.28 K/UL (ref 1.8–8)
NEUTS SEG NFR BLD: 55 % (ref 32–75)
NITRITE UR QL STRIP.AUTO: POSITIVE
NRBC # BLD: 0 K/UL (ref 0–0.01)
NRBC BLD-RTO: 0 PER 100 WBC
PH UR STRIP: 5 (ref 5–8)
PLATELET # BLD AUTO: 260 K/UL (ref 150–400)
PMV BLD AUTO: 10.2 FL (ref 8.9–12.9)
POTASSIUM SERPL-SCNC: 4.1 MMOL/L (ref 3.5–5.1)
PROT SERPL-MCNC: 7.3 G/DL (ref 6.4–8.2)
PROT UR STRIP-MCNC: NEGATIVE MG/DL
RBC # BLD AUTO: 4.56 M/UL (ref 3.8–5.2)
RBC #/AREA URNS HPF: ABNORMAL /HPF (ref 0–5)
SODIUM SERPL-SCNC: 137 MMOL/L (ref 136–145)
SP GR UR REFRACTOMETRY: 1.02 (ref 1–1.03)
TSH SERPL DL<=0.05 MIU/L-ACNC: 0.85 UIU/ML (ref 0.36–3.74)
UROBILINOGEN UR QL STRIP.AUTO: 0.2 EU/DL (ref 0.2–1)
WBC # BLD AUTO: 9.6 K/UL (ref 3.6–11)
WBC URNS QL MICRO: ABNORMAL /HPF (ref 0–4)

## 2025-06-23 ENCOUNTER — RESULTS FOLLOW-UP (OUTPATIENT)
Facility: CLINIC | Age: 75
End: 2025-06-23

## 2025-06-23 DIAGNOSIS — N30.00 ACUTE CYSTITIS WITHOUT HEMATURIA: Primary | ICD-10-CM

## 2025-06-23 RX ORDER — CEFUROXIME AXETIL 250 MG/1
250 TABLET ORAL 2 TIMES DAILY
Qty: 14 TABLET | Refills: 0 | Status: SHIPPED | OUTPATIENT
Start: 2025-06-23 | End: 2025-06-30

## 2025-07-27 DIAGNOSIS — F51.01 PRIMARY INSOMNIA: ICD-10-CM

## 2025-07-30 RX ORDER — TRAZODONE HYDROCHLORIDE 50 MG/1
50 TABLET ORAL NIGHTLY
Qty: 90 TABLET | Refills: 1 | Status: SHIPPED | OUTPATIENT
Start: 2025-07-30

## 2025-09-03 ENCOUNTER — TRANSCRIBE ORDERS (OUTPATIENT)
Facility: HOSPITAL | Age: 75
End: 2025-09-03

## 2025-09-03 DIAGNOSIS — E21.0 PRIMARY HYPERPARATHYROIDISM: Primary | ICD-10-CM

## (undated) DEVICE — TOWEL SURG W17XL27IN STD BLU COT NONFENESTRATED PREWASHED

## (undated) DEVICE — SKIN MARKER,REGULAR TIP WITH RULER AND LABELS: Brand: DEVON

## (undated) DEVICE — POLYLINED TOWEL: Brand: CONVERTORS

## (undated) DEVICE — STERILE POLYISOPRENE POWDER-FREE SURGICAL GLOVES: Brand: PROTEXIS

## (undated) DEVICE — SYR 5ML 1/5 GRAD LL NSAF LF --

## (undated) DEVICE — NEEDLE SPNL L4.75IN OD25GA QNCKE TYP SPINOCAN

## (undated) DEVICE — SYR 3ML LL TIP 1/10ML GRAD --

## (undated) DEVICE — NEEDLE HYPO 18GA L1.5IN PNK S STL HUB POLYPR SHLD REG BVL

## (undated) DEVICE — CATH IV AUTOGRD BC BLU 22GA 25 -- INSYTE

## (undated) DEVICE — NEEDLE SPNL 22GA L7IN BLK HUB S STL W/ QNCKE PNT W/OUT

## (undated) DEVICE — SET GRAV CK VLV NEEDLESS ST 3 GANGED 4WAY STPCOCK HI FLO 10

## (undated) DEVICE — ENDOSCOPIC KIT COMPLIANCE ENDOKIT

## (undated) DEVICE — ADULT SPO2 SENSOR: Brand: NELLCOR

## (undated) DEVICE — SYR 10ML LUER LOK 1/5ML GRAD --

## (undated) DEVICE — SET ADMIN 16ML TBNG L100IN 2 Y INJ SITE IV PIGGY BK DISP

## (undated) DEVICE — FORCEPS BX L240CM JAW DIA2.4MM ORNG L CAP W/ NDL DISP RAD

## (undated) DEVICE — SNARE ENDOSCP M L240CM W27MM SHTH DIA2.4MM CHN 2.8MM OVL

## (undated) DEVICE — NEEDLE HYPO 25GA L1.5IN BVL ORIENTED ECLIPSE

## (undated) DEVICE — Z DISCONTINUED PER MEDLINE LINE GAS SAMPLING O2/CO2 LNG AD 13 FT NSL W/ TBNG FILTERLINE

## (undated) DEVICE — SNARE ENDOSCP AD L240CM LOOP W10MM SHTH DIA2.4MM RND INSUL

## (undated) DEVICE — Device

## (undated) DEVICE — SET EXTN PRIMING 0.59ML 8.5IN 1.55LB PRSS RATE MINIBOR PUR

## (undated) DEVICE — PREP SKN CHLRAPRP APL 26ML STR --

## (undated) DEVICE — FORCEPS BX L240CM JAW DIA2.8MM L CAP W/ NDL MIC MESH TOOTH

## (undated) DEVICE — ELECTRODE PT RET AD L9FT HI MOIST COND ADH HYDRGEL CORDED

## (undated) DEVICE — INJECTION THERAPY NEEDLE CATHETER: Brand: INTERJECT

## (undated) DEVICE — (D)PREP SKN CHLRAPRP APPL 26ML -- CONVERT TO ITEM 371833

## (undated) DEVICE — TIP SUCT TRNSPAR RIB SURF STD BLB RIG NVENT W/ 5IN1 CONN DYND50138] MEDLINE INDUSTRIES INC]

## (undated) DEVICE — ELECTRODE,RADIOTRANSLUCENT,FOAM,5PK: Brand: MEDLINE

## (undated) DEVICE — SNARE ENDOSCP POLYP MED STD AD 2.4X27X240 CM 2.8 MM OVL SENS

## (undated) DEVICE — CONTAINER SPEC 20 ML LID NEUT BUFF FORMALIN 10 % POLYPR STS

## (undated) DEVICE — TOWEL 4 PLY TISS 19X30 SUE WHT

## (undated) DEVICE — TRAP SUC MUCOUS 70ML -- MEDICHOICE MEDLINE

## (undated) DEVICE — SOLIDIFIER MEDC 1200ML -- CONVERT TO 356117

## (undated) DEVICE — MARKER,SKIN,WI/RULER AND LABELS: Brand: MEDLINE

## (undated) DEVICE — NEONATAL-ADULT SPO2 SENSOR: Brand: NELLCOR

## (undated) DEVICE — TRAP ENDOSCP POLYP 2 CHMBR DRAWER TYP

## (undated) DEVICE — SNARE ENDOSCP L240CM LOOP W27MM SHTH DIA2.4MM WRK CHN 2.8MM

## (undated) DEVICE — STRAINER URIN CALC RNL MSH -- CONVERT TO ITEM 357634

## (undated) DEVICE — IV START KIT: Brand: MEDLINE

## (undated) DEVICE — 1200 GUARD II KIT W/5MM TUBE W/O VAC TUBE: Brand: GUARDIAN

## (undated) DEVICE — BASIN EMSIS 16OZ GRAPHITE PLAS KID SHP MOLD GRAD FOR ORAL

## (undated) DEVICE — FIAPC® PROBE W/ FILTER 2200 C OD 2.3MM/6.9FR; L 2.2M/7.2FT: Brand: ERBE

## (undated) DEVICE — GLOVE ORANGE PI 8   MSG9080

## (undated) DEVICE — BAG SPEC BIOHZRD 10 X 10 IN --

## (undated) DEVICE — CUFF BLD PRSS AD CLTH SGL TB W/ BAYNT CONN ROUNDED CORNER